# Patient Record
Sex: FEMALE | Race: WHITE | Employment: FULL TIME | URBAN - METROPOLITAN AREA
[De-identification: names, ages, dates, MRNs, and addresses within clinical notes are randomized per-mention and may not be internally consistent; named-entity substitution may affect disease eponyms.]

---

## 2017-02-22 ENCOUNTER — OFFICE VISIT (OUTPATIENT)
Dept: OBGYN CLINIC | Age: 42
End: 2017-02-22

## 2017-02-22 VITALS
DIASTOLIC BLOOD PRESSURE: 60 MMHG | WEIGHT: 148.4 LBS | SYSTOLIC BLOOD PRESSURE: 110 MMHG | HEIGHT: 62 IN | BODY MASS INDEX: 27.31 KG/M2

## 2017-02-22 DIAGNOSIS — Z01.419 ENCOUNTER FOR GYNECOLOGICAL EXAMINATION WITHOUT ABNORMAL FINDING: Primary | ICD-10-CM

## 2017-02-22 RX ORDER — AMOXICILLIN AND CLAVULANATE POTASSIUM 875; 125 MG/1; MG/1
TABLET, FILM COATED ORAL
COMMUNITY
Start: 2017-02-10 | End: 2017-03-17

## 2017-02-22 RX ORDER — FLUCONAZOLE 150 MG/1
150 TABLET ORAL DAILY
Qty: 1 TAB | Refills: 1 | Status: SHIPPED | OUTPATIENT
Start: 2017-02-22 | End: 2017-02-23

## 2017-02-22 NOTE — PROGRESS NOTES
Altagracia Vizcarra is a ,  46 y.o. female [facility] whose Patient's last menstrual period was 2017. who presents for her annual checkup. She is having yeast symptoms and is requesting a Diflucan. She has just finished an antibiotic and is c/o vaginal itching and discharge. With regard to the Gardisil vaccine, she is older than the FDA approved age to receive it. Menstrual status:    Her periods are heavy in flow, cannot leave house on first day. She is using three to five pads or tampons per day, usually regular and occur every 26-30 days. She denies dysmenorrhea. She reports no premenstrual symptoms. Contraception:    The current method of family planning is vasectomy. Sexual history:    She  reports that she currently engages in sexual activity and has had male partners. Medical conditions:    Since her last annual GYN exam about one year ago, she has not the following changes in her health history: none. Pap and Mammogram History:    Her most recent Pap smear was normal, HPV neg obtained 2016. The patient had a mammogram  which was negative for malignancy. The patient does have a family history of breast cancer. Past Medical History:   Diagnosis Date    Bulging disc     Family history of skin cancer     Fetal macrosomia in pregnancy     Scarring     Sun-damaged skin      Past Surgical History:   Procedure Laterality Date    HX  SECTION      2       Current Outpatient Prescriptions   Medication Sig Dispense Refill    amoxicillin-clavulanate (AUGMENTIN) 875-125 mg per tablet       fluticasone (FLONASE) 50 mcg/actuation nasal spray USE 1 SPRAY IN EACH NOSTRIL TWICE A DAY AS NEEDED. 1    venlafaxine-SR (EFFEXOR-XR) 37.5 mg capsule Take 1 Cap by mouth daily.  30 Cap 3    terconazole (TERAZOL 3) 80 mg vaginal suppository One per vagina at bedtime for 3 nights 3 Suppository 0     Allergies: Nuvaring [etonogestrel-ethinyl estradiol] and Adhesive   Social History     Social History    Marital status:      Spouse name: N/A    Number of children: N/A    Years of education: N/A     Occupational History    Not on file. Social History Main Topics    Smoking status: Never Smoker    Smokeless tobacco: Never Used    Alcohol use 0.6 - 1.2 oz/week     1 - 2 Glasses of wine per week    Drug use: No    Sexual activity: Yes     Partners: Male      Comment: partner w/ vasectomy     Other Topics Concern    Not on file     Social History Narrative     Tobacco History:  reports that she has never smoked. She has never used smokeless tobacco.  Alcohol Abuse:  reports that she drinks about 0.6 - 1.2 oz of alcohol per week   Drug Abuse:  reports that she does not use illicit drugs.     Patient Active Problem List   Diagnosis Code    Urinary frequency R35.0    Post partum depression F53    Bunion, left foot M21.612    Chronic back pain M54.9, G89.29       Review of Systems - History obtained from the patient  Constitutional: negative for weight loss, fever, night sweats  HEENT: negative for hearing loss, earache, congestion, snoring, sorethroat  CV: negative for chest pain, palpitations, edema  Resp: negative for cough, shortness of breath, wheezing  GI: negative for change in bowel habits, abdominal pain, black or bloody stools  : negative for frequency, dysuria, hematuria, vaginal discharge  MSK: negative for back pain, joint pain, muscle pain  Breast: negative for breast lumps, nipple discharge, galactorrhea  Skin :negative for itching, rash, hives  Neuro: negative for dizziness, headache, confusion, weakness  Psych: negative for anxiety, depression, change in mood  Heme/lymph: negative for bleeding, bruising, pallor    Physical Exam    Visit Vitals    /60    Ht 5' 2\" (1.575 m)    Wt 148 lb 6.4 oz (67.3 kg)    LMP 02/12/2017    BMI 27.14 kg/m2       Constitutional  · Appearance: well-nourished, well developed, alert, in no acute distress    HENT  · Head and Face: appears normal    Neck  · Inspection/Palpation: normal appearance, no masses or tenderness  · Lymph Nodes: no lymphadenopathy present  · Thyroid: gland size normal, nontender, no nodules or masses present on palpation    Chest  · Respiratory Effort: breathing normal  · Auscultation: normal breath sounds    Cardiovascular  · Heart:  · Auscultation: regular rate and rhythm without murmur    Breasts  · Inspection of Breasts: breasts symmetrical, no skin changes, no discharge present, nipple appearance normal, no skin retraction present  · Palpation of Breasts and Axillae: no masses present on palpation, no breast tenderness  · Axillary Lymph Nodes: no lymphadenopathy present    Gastrointestinal  · Abdominal Examination: abdomen non-tender to palpation, normal bowel sounds, no masses present  · Liver and spleen: no hepatomegaly present, spleen not palpable  · Hernias: no hernias identified    Genitourinary  · External Genitalia: normal appearance for age, no discharge present, no tenderness present, no inflammatory lesions present, no masses present, no atrophy present  · Vagina: normal vaginal vault without central or paravaginal defects, no discharge present, no inflammatory lesions present, no masses present  · Bladder: non-tender to palpation  · Urethra: appears normal  · Cervix: normal   · Uterus: normal size, shape and consistency  · Adnexa: no adnexal tenderness present, no adnexal masses present  · Perineum: perineum within normal limits, no evidence of trauma, no rashes or skin lesions present  · Anus: anus within normal limits, no hemorrhoids present  · Inguinal Lymph Nodes: no lymphadenopathy present    Skin  · General Inspection: no rash, no lesions identified    Neurologic/Psychiatric  · Mental Status:  · Orientation: grossly oriented to person, place and time  · Mood and Affect: mood normal, affect appropriate    .   Assessment:  Routine gynecologic examination  Her current medical status is satisfactory with no evidence of significant gynecologic issues.   menorrhagia  Plan:  Counseled re: diet, exercise, healthy lifestyle  Return for yearly wellness visits  Diflucan - in middle of monistat tx  Disc Novasure again - if desires needs end bx and sonohyst prior

## 2017-02-22 NOTE — PATIENT INSTRUCTIONS

## 2017-03-17 ENCOUNTER — TELEPHONE (OUTPATIENT)
Dept: OBGYN CLINIC | Age: 42
End: 2017-03-17

## 2017-03-17 ENCOUNTER — OFFICE VISIT (OUTPATIENT)
Dept: OBGYN CLINIC | Age: 42
End: 2017-03-17

## 2017-03-17 VITALS
BODY MASS INDEX: 27.23 KG/M2 | DIASTOLIC BLOOD PRESSURE: 60 MMHG | SYSTOLIC BLOOD PRESSURE: 116 MMHG | WEIGHT: 148 LBS | HEIGHT: 62 IN

## 2017-03-17 DIAGNOSIS — N76.0 VAGINITIS AND VULVOVAGINITIS: Primary | ICD-10-CM

## 2017-03-17 DIAGNOSIS — B37.31 YEAST VAGINITIS: ICD-10-CM

## 2017-03-17 DIAGNOSIS — R30.9 URINARY PAIN: ICD-10-CM

## 2017-03-17 DIAGNOSIS — N89.8 VAGINAL ITCHING: ICD-10-CM

## 2017-03-17 LAB
BILIRUB UR QL STRIP: NEGATIVE
GLUCOSE UR-MCNC: NEGATIVE MG/DL
KETONES P FAST UR STRIP-MCNC: NEGATIVE MG/DL
PH UR STRIP: NORMAL [PH] (ref 4.6–8)
PROT UR QL STRIP: NEGATIVE MG/DL
SP GR UR STRIP: NORMAL (ref 1–1.03)
UA UROBILINOGEN AMB POC: NORMAL (ref 0.2–1)
URINALYSIS CLARITY POC: CLEAR
URINALYSIS COLOR POC: NORMAL
URINE BLOOD POC: NEGATIVE
URINE LEUKOCYTES POC: NEGATIVE
URINE NITRITES POC: NEGATIVE
WET MOUNT POCT, WMPOCT: NORMAL

## 2017-03-17 RX ORDER — NYSTATIN AND TRIAMCINOLONE ACETONIDE 100000; 1 [USP'U]/G; MG/G
CREAM TOPICAL 2 TIMES DAILY
Qty: 30 G | Refills: 0 | Status: SHIPPED | OUTPATIENT
Start: 2017-03-17 | End: 2018-05-31

## 2017-03-17 RX ORDER — TERCONAZOLE 4 MG/G
1 CREAM VAGINAL
Qty: 45 G | Refills: 0 | Status: CANCELLED | OUTPATIENT
Start: 2017-03-17

## 2017-03-17 RX ORDER — TERCONAZOLE 8 MG/G
1 CREAM VAGINAL
Qty: 20 G | Refills: 0 | Status: SHIPPED | OUTPATIENT
Start: 2017-03-17 | End: 2017-04-05 | Stop reason: SDUPTHER

## 2017-03-17 NOTE — PROGRESS NOTES
Vaginitis evaluation    Chief Complaint   Vaginitis      HPI  46 y.o. female complains of minimal vaginal discharge. Patient's last menstrual period was 2017. She reports additional symptoms at this time:  Vaginal itching and burning. The patient denies aggravating factors. She is not concerned about possible STI exposure at this time. She denies exposure to new chemicals ot hygenic agents  Previous treatment included: none    Past Medical History:   Diagnosis Date    Bulging disc     Family history of skin cancer     Fetal macrosomia in pregnancy     Scarring     Sun-damaged skin      Past Surgical History:   Procedure Laterality Date    HX  SECTION      2     Social History     Occupational History    Not on file. Social History Main Topics    Smoking status: Never Smoker    Smokeless tobacco: Never Used    Alcohol use 0.6 - 1.2 oz/week     1 - 2 Glasses of wine per week    Drug use: No    Sexual activity: Yes     Partners: Male      Comment: partner w/ vasectomy     Family History   Problem Relation Age of Onset    Colon Cancer Mother     Cancer Mother 72     colon    Cancer Father      lung and liver    Alcohol abuse Father     Breast Cancer Maternal Aunt     Diabetes Maternal Aunt     Hypertension Maternal Aunt         Allergies   Allergen Reactions    Nuvaring [Etonogestrel-Ethinyl Estradiol] Hives    Adhesive Rash     Prior to Admission medications    Medication Sig Start Date End Date Taking? Authorizing Provider   nystatin-triamcinolone (MYCOLOG II) topical cream Apply  to affected area two (2) times a day. 3/17/17  Yes Gerardo Reynolds MD   terconazole (TERAZOL 3) 0.8 % vaginal cream Insert 1 Applicator into vagina nightly. 3/17/17  Yes Gerardo Reynolds MD   fluticasone (FLONASE) 50 mcg/actuation nasal spray USE 1 SPRAY IN EACH NOSTRIL TWICE A DAY AS NEEDED. 16  Yes Historical Provider   venlafaxine-SR (EFFEXOR-XR) 37.5 mg capsule Take 1 Cap by mouth daily. 11/3/16  Yes Hiwot Monet MD                      Review of Systems - History obtained from the patient  Constitutional: negative for weight loss, fever, night sweats  Breast: negative for breast lumps, nipple discharge, galactorrhea  GI: negative for change in bowel habits, abdominal pain, black or bloody stools  : negative for frequency, dysuria, hematuria  MSK: negative for back pain, joint pain, muscle pain  Skin: negative for itching, rash, hives  Neuro: negative for dizziness, headache, confusion, weakness  Psych: negative for anxiety, depression, change in mood  Heme/lymph: negative for bleeding, bruising, pallor       Objective:    Visit Vitals    /60    Ht 5' 2\" (1.575 m)    Wt 148 lb (67.1 kg)    LMP 03/12/2017    BMI 27.07 kg/m2       Physical Exam:   PHYSICAL EXAMINATION    Constitutional  · Appearance: well-nourished, well developed, alert, in no acute distress    HENT  · Head and Face: appears normal    Genitourinary  · External Genitalia: normal appearance for age, no discharge present, no tenderness present, no inflammatory lesions present, no masses present, no atrophy present  · Vagina:  white discharge present, otherwise normal vaginal vault without central or paravaginal defects, no inflammatory lesions present, no masses present  · Bladder: non-tender to palpation  · Urethra: appears normal  · Cervix: normal   · Uterus: normal size, shape and consistency  · Adnexa: no adnexal tenderness present, no adnexal masses present  · Perineum: perineum within normal limits, no evidence of trauma, no rashes or skin lesions present  · Anus: anus within normal limits, no hemorrhoids present  · Inguinal Lymph Nodes: no lymphadenopathy present    Skin  · General Inspection: no rash, no lesions identified    Neurologic/Psychiatric  · Mental Status:  · Orientation: grossly oriented to person, place and time  · Mood and Affect: mood normal, affect appropriate      .     Results for orders placed or performed in visit on 03/17/17   AMB POC SMEAR, STAIN & INTERPRET, WET MOUNT   Result Value Ref Range    Wet mount (POC)      Narrative    KOH    Hypae: pos  Buds: pos    Wet Prep:  Trich: negative  Clue cells: negative  Hyphae: negative  Buds: negative  WBC's: normal     AMB POC URINALYSIS DIP STICK MANUAL W/O MICRO   Result Value Ref Range    Color (UA POC) Dark Yellow     Clarity (UA POC) Clear     Glucose (UA POC) Negative Negative    Bilirubin (UA POC) Negative Negative    Ketones (UA POC) Negative Negative    Specific gravity (UA POC)  1.001 - 1.035    Blood (UA POC) Negative Negative    pH (UA POC)  4.6 - 8.0    Protein (UA POC) Negative Negative mg/dL    Urobilinogen (UA POC) normal 0.2 - 1    Nitrites (UA POC) Negative Negative    Leukocyte esterase (UA POC) Negative Negative       Assessment:   Yeast vag    Plan:   Terazol 3  mycolog  Nuswab    ROV prn if symptoms persist or worsen.

## 2017-03-17 NOTE — PATIENT INSTRUCTIONS
Pelvic Exam: Care Instructions  Your Care Instructions    When your doctor examines all of your pelvic organs, it's called a pelvic exam. Two good reasons to have this kind of exam are to check for sexually transmitted infections (STIs) and to get a Pap test. A Pap test is also called a Pap smear. It checks for early changes that can lead to cancer of the cervix. Sometimes a pelvic exam is part of a regular checkup. In this case, you can do some things to make your test results as accurate as possible. · Try to schedule the exam when you don't have your period. · Don't use douches, tampons, or vaginal medicines, sprays, or powders for 24 hours before your exam.  · Don't have sex for 24 hours before your exam.  Other times, women have this kind of exam at any time of the month. This is because they have pelvic pain, bleeding, or discharge. Or they may have another pelvic problem. Before your exam, it's important to share some information with your doctor. For example, if you are a survivor of rape or sexual abuse, you can talk about any concerns you may have. Your doctor will also want to know if you are pregnant or use birth control. And he or she will want to hear about any problems, surgeries, or procedures you have had in your pelvic area. You will also need to tell your doctor when your last period was. Follow-up care is a key part of your treatment and safety. Be sure to make and go to all appointments, and call your doctor if you are having problems. It's also a good idea to know your test results and keep a list of the medicines you take. How is a pelvic exam done? · During a pelvic exam, you will:  ¨ Take off your clothes below the waist. You will get a paper or cloth cover to put over the lower half of your body. Leonard Maidens on your back on an exam table. Your feet will be raised above you. Stirrups will support your feet. · The doctor will:  Wale Phan you to relax your knees.  Your knees need to lean out, toward the walls. ¨ Check the opening of your vagina for sores or swelling. ¨ Gently put a tool called a speculum into your vagina. It opens the vagina a little bit. You will feel some pressure. But if you are relaxed, it will not hurt. It lets your doctor see inside the vagina. ¨ Use a small brush, spatula, or swab to get a sample of cells, if you are having a Pap test or culture. The doctor then removes the speculum. ¨ Put on gloves and put one or two fingers of one hand into your vagina. The other hand goes on your lower belly. This lets your doctor feel your pelvic organs. You will probably feel some pressure. Try to stay relaxed. ¨ Put one gloved finger into your rectum and one into your vagina, if needed. This can also help check your pelvic organs. This exam takes about 10 minutes. At the end, you will get a washcloth or tissue to clean your vaginal area. It's normal to have some discharge after this exam. You can then get dressed. Some test results may be ready right away. But results from a culture or a Pap test may take several days or a few weeks. Why should you have a pelvic exam?  · You want to have recommended screening tests. This includes a Pap test.  · You think you have a vaginal infection. Signs include itching, burning, or unusual discharge. · You might have been exposed to a sexually transmitted infection (STI), such as chlamydia or herpes. · You have vaginal bleeding that is not part of your normal menstrual period. · You have pain in your belly or pelvis. · You have been sexually assaulted. A pelvic exam lets your doctor collect evidence and check for STIs. · You are pregnant. · You are having trouble getting pregnant. What are the risks of a pelvic exam?  There are no risks from a pelvic exam.  When should you call for help?   Watch closely for changes in your health, and be sure to contact your doctor if:  · You have heavy bleeding or discharge from your vagina after the exam.  Where can you learn more? Go to http://giovanna-ankit.info/. Enter W731 in the search box to learn more about \"Pelvic Exam: Care Instructions. \"  Current as of: February 25, 2016  Content Version: 11.1  © 0841-0307 [employer], [email or web address]. Care instructions adapted under license by [employer] (which disclaims liability or warranty for this information). If you have questions about a medical condition or this instruction, always ask your healthcare professional. [address] any warranty or liability for your use of this information.

## 2017-03-17 NOTE — TELEPHONE ENCOUNTER
Call received at 8:34 am    46year old patient last seen in the office on 2/22/17 for AE. Patient calling to say that she is having itching,redness, irritation and burning upon urination for about the last 5-6 days. Patient placed on the schedule for 9:00am this am . ( ok per April to db)  Patient verbalized understanding.

## 2017-03-21 LAB
A VAGINAE DNA VAG QL NAA+PROBE: ABNORMAL SCORE
BVAB2 DNA VAG QL NAA+PROBE: ABNORMAL SCORE
C ALBICANS DNA VAG QL NAA+PROBE: POSITIVE
C GLABRATA DNA VAG QL NAA+PROBE: NEGATIVE
MEGA1 DNA VAG QL NAA+PROBE: ABNORMAL SCORE
T VAGINALIS RRNA SPEC QL NAA+PROBE: NEGATIVE

## 2017-04-04 ENCOUNTER — TELEPHONE (OUTPATIENT)
Dept: OBGYN CLINIC | Age: 42
End: 2017-04-04

## 2017-04-04 NOTE — TELEPHONE ENCOUNTER
Patient calling stating that she is still having an issue with yeast and has been trying everything to clear it up with minimal relief. Patient reports that after she finished the terazol cream, she started a candida cleans diet, probiotics and tried OTC monistat to try and see if she could get rid of it. Patient reports itchiness, rawness and irritation on the inside, not so much on the outside. She has been using the mycolog cream on the outside that has helped. Patient is requesting help with treatment of this yeast infection. Patient reports that she read where a longer treatment diflucan may be needed and is willing to try anything. Please advise.

## 2017-04-05 RX ORDER — FLUCONAZOLE 150 MG/1
150 TABLET ORAL
Qty: 4 TAB | Refills: 5 | Status: SHIPPED | OUTPATIENT
Start: 2017-04-05 | End: 2017-10-31 | Stop reason: SDUPTHER

## 2017-04-05 RX ORDER — TERCONAZOLE 8 MG/G
1 CREAM VAGINAL
Qty: 20 G | Refills: 0 | Status: SHIPPED | OUTPATIENT
Start: 2017-04-05 | End: 2017-05-22 | Stop reason: SDUPTHER

## 2017-04-05 NOTE — TELEPHONE ENCOUNTER
Patient calling back and aware of MD recommendations. Patient aware that rx was sent to pharmacy of choice.

## 2017-05-22 ENCOUNTER — TELEPHONE (OUTPATIENT)
Dept: OBGYN CLINIC | Age: 42
End: 2017-05-22

## 2017-05-22 RX ORDER — TERCONAZOLE 8 MG/G
1 CREAM VAGINAL
Qty: 20 G | Refills: 0 | Status: SHIPPED | OUTPATIENT
Start: 2017-05-22 | End: 2017-06-07

## 2017-05-22 NOTE — TELEPHONE ENCOUNTER
Patient calling who is a long term treatment for yeast (weekly diflucan), missed her dose last week and was placed on Augmentin for a sinus infection 1.5 weeks ago and now reports vaginal burning, discharge & itching. Patient is due for her weekly diflucan today but is requesting a rx for terazol to take with the diflucan like she did when she started this course of diflucan. Please advise.

## 2017-05-25 NOTE — TELEPHONE ENCOUNTER
Call received in 1:14pm      46year old patient last seen in the office on 3/17/17. Patient calling back from Monday conversation. Patient reports she has completed the Terazol prescription and is still using the cream. Patient reports her vaginal itching and burning is better but she states her tongue is still pasty white . Patient states she has a prescription for Difucan that she had been taking once a week but missed to pills Patient is wondering if she can take a pill today and then again on Monday for her once a week.  Please advise

## 2017-06-07 ENCOUNTER — OFFICE VISIT (OUTPATIENT)
Dept: INTERNAL MEDICINE CLINIC | Age: 42
End: 2017-06-07

## 2017-06-07 VITALS
TEMPERATURE: 98.2 F | SYSTOLIC BLOOD PRESSURE: 98 MMHG | OXYGEN SATURATION: 98 % | HEART RATE: 83 BPM | WEIGHT: 142 LBS | HEIGHT: 62 IN | BODY MASS INDEX: 26.13 KG/M2 | DIASTOLIC BLOOD PRESSURE: 56 MMHG | RESPIRATION RATE: 14 BRPM

## 2017-06-07 DIAGNOSIS — J30.1 SEASONAL ALLERGIC RHINITIS DUE TO POLLEN: ICD-10-CM

## 2017-06-07 DIAGNOSIS — B37.0 ORAL YEAST INFECTION: Primary | ICD-10-CM

## 2017-06-07 RX ORDER — FLUCONAZOLE 200 MG/1
200 TABLET ORAL DAILY
Qty: 5 TAB | Refills: 0 | Status: SHIPPED | OUTPATIENT
Start: 2017-06-07 | End: 2017-06-12

## 2017-06-07 RX ORDER — CLOTRIMAZOLE 10 MG/1
10 LOZENGE ORAL; TOPICAL
COMMUNITY
End: 2018-05-31

## 2017-06-07 NOTE — PROGRESS NOTES
HISTORY OF PRESENT ILLNESS  Stefani Dalton Case is a 46 y.o. female. HPI  Patient presents today for a recurring yeast infection. In January she wasn't feeling well with a rash on both arms and tingling in her head. She had imaging for her head and was told she had Shingles by her specialist. She was prescribed Prednisone. In February she had a sinus infection and given Augmentin. Patient then developed yeast infection and given Diflucan and Terazol. 3 weeks ago she was put on Augmentin for sinus infection. She now complains of thrush on tongue. Patient is taking probiotic as followed by GYN. Patient was taking Phentermine prescribed by Dr. Kyra Blair. She has stopped this since being acutely ill. Review of Systems   All other systems reviewed and are negative. Physical Exam   Constitutional: She is oriented to person, place, and time. She appears well-developed and well-nourished. HENT:   Head: Normocephalic and atraumatic. Right Ear: External ear normal.   Left Ear: External ear normal.   Nose: Nose normal.   Mouth/Throat: Oropharynx is clear and moist.   Thrush underneath her tongue. Eyes: Conjunctivae and EOM are normal.   Neck: Normal range of motion. Neck supple. Carotid bruit is not present. No thyroid mass and no thyromegaly present. Cardiovascular: Normal rate, regular rhythm, S1 normal, S2 normal, normal heart sounds and intact distal pulses. Pulmonary/Chest: Effort normal and breath sounds normal.   Abdominal: Soft. Normal appearance and bowel sounds are normal. There is no hepatosplenomegaly. There is no tenderness. Musculoskeletal: Normal range of motion. Neurological: She is alert and oriented to person, place, and time. She has normal strength. No cranial nerve deficit or sensory deficit. Coordination normal.   Skin: Skin is warm, dry and intact. No abrasion and no rash noted. Psychiatric: She has a normal mood and affect.  Her behavior is normal. Judgment and thought content normal.   Nursing note and vitals reviewed. ASSESSMENT and PLAN  Hazel Calderon was seen today for yeast infection. Diagnoses and all orders for this visit:    Oral yeast infection  Prescribed Diflucan. Follow up with GYN for vaginal probiotic for frequent yeast infections. Do not restart Phentermine until completed Diflucan. Seasonal allergic rhinitis due to pollen  Continue to use Flonase and Zyrtec. Consider Xyzal if Zyrtec or Claritin ineffective. Other orders  -     fluconazole (DIFLUCAN) 200 mg tablet; Take 1 Tab by mouth daily for 5 days. FDA advises cautious prescribing of oral fluconazole in pregnancy. lab results and schedule of future lab studies reviewed with patient  reviewed diet, exercise and weight control    Written by Ct Gonzales, as dictated by Jane Vaughn MD.     Current diagnosis and concerns discussed with pt at length. Understands risks and benefits or current treatment plan and medications and accepts the treatment and medication with any possible risks. Pt asks appropriate questions which were answered. Pt instructed to call with any concerns or problems.

## 2017-06-30 ENCOUNTER — OFFICE VISIT (OUTPATIENT)
Dept: OBGYN CLINIC | Age: 42
End: 2017-06-30

## 2017-06-30 ENCOUNTER — APPOINTMENT (OUTPATIENT)
Dept: MAMMOGRAPHY | Age: 42
End: 2017-06-30

## 2017-06-30 DIAGNOSIS — Z12.31 ENCOUNTER FOR SCREENING MAMMOGRAM FOR MALIGNANT NEOPLASM OF BREAST: ICD-10-CM

## 2017-06-30 DIAGNOSIS — Z12.31 ENCOUNTER FOR SCREENING MAMMOGRAM FOR MALIGNANT NEOPLASM OF BREAST: Primary | ICD-10-CM

## 2017-08-24 ENCOUNTER — TELEPHONE (OUTPATIENT)
Dept: OBGYN CLINIC | Age: 42
End: 2017-08-24

## 2017-08-24 RX ORDER — TERCONAZOLE 4 MG/G
1 CREAM VAGINAL
Qty: 45 G | Refills: 0 | Status: SHIPPED | OUTPATIENT
Start: 2017-08-24 | End: 2018-05-23 | Stop reason: SDUPTHER

## 2017-08-24 NOTE — TELEPHONE ENCOUNTER
Patient calling stating that 1.5 weeks ago she was on vacation and forgotten to take her diflucan dose as she is on weekly diflucan and noticed that when she got back she is having a full blown yeast infection with vaginal/vulvar itching and vaginal discharge making it uncomfortable to sit/walk. Patient took 1 pill of diflucan and OTC monistat 3 days ago with no relief. Patient was in a wet bathing suit while on vacation and believes this triggered the yeast.    Please advise.

## 2017-09-29 ENCOUNTER — TELEPHONE (OUTPATIENT)
Dept: OBGYN CLINIC | Age: 42
End: 2017-09-29

## 2017-09-29 RX ORDER — FLUCONAZOLE 150 MG/1
150 TABLET ORAL DAILY
Qty: 1 TAB | Refills: 0 | Status: SHIPPED | OUTPATIENT
Start: 2017-09-29 | End: 2017-09-30

## 2017-09-29 NOTE — TELEPHONE ENCOUNTER
Patient calling stating that she recently went to the pool with her children and now has another yeast infection and she does not have anymore refills on the weekly diflucan that she was prescribed. Patient states that based on her insurance, it can be cheaper if she uses the 200mg tablets and get a whole months worth at a time. Patient states that she got the 3 day monistat as she states she \"was desperate for relief\". Please advise.

## 2017-09-29 NOTE — TELEPHONE ENCOUNTER
John Kenny MD   You 2 hours ago (11:36 AM)                 One diflucan 150 mg.  Use hydrocortisone externally tid (Routing comment)

## 2017-10-31 ENCOUNTER — OFFICE VISIT (OUTPATIENT)
Dept: INTERNAL MEDICINE CLINIC | Age: 42
End: 2017-10-31

## 2017-10-31 VITALS
TEMPERATURE: 98.1 F | HEIGHT: 62 IN | WEIGHT: 144 LBS | DIASTOLIC BLOOD PRESSURE: 56 MMHG | BODY MASS INDEX: 26.5 KG/M2 | OXYGEN SATURATION: 99 % | RESPIRATION RATE: 14 BRPM | HEART RATE: 65 BPM | SYSTOLIC BLOOD PRESSURE: 115 MMHG

## 2017-10-31 DIAGNOSIS — B37.9 YEAST INFECTION: Primary | ICD-10-CM

## 2017-10-31 RX ORDER — FLUCONAZOLE 200 MG/1
200 TABLET ORAL
Qty: 4 TAB | Refills: 5 | Status: SHIPPED | OUTPATIENT
Start: 2017-10-31 | End: 2018-05-23 | Stop reason: SDUPTHER

## 2017-10-31 RX ORDER — FLUCONAZOLE 200 MG/1
200 TABLET ORAL
Qty: 4 TAB | Refills: 5 | Status: SHIPPED | OUTPATIENT
Start: 2017-10-31 | End: 2017-10-31 | Stop reason: SDUPTHER

## 2017-10-31 NOTE — PROGRESS NOTES
HISTORY OF PRESENT ILLNESS  Angela Vizcarra is a 38 y.o. female. HPI   Patient reports a continued yeast infection. She admits to burning, irritation, and redness vaginally. She notes the Diflucan 200 mg  helped to clear the infection, but since she stopped Diflucan it has come back. She notes the infection is also in her mouth. Patient reports she has been watching her diet and is unsure of why the infection is recurring. Patient reports she has followed up with dermatology and GYN with no resolve for the infection. She inquires about following up with infectious disease or an allergist. Patient reports she has been watching her diet because some foods cause swelling and irritation in her neck. Review of Systems   All other systems reviewed and are negative. Physical Exam   Constitutional: She is oriented to person, place, and time. She appears well-developed and well-nourished. Neck: Normal range of motion. Neck supple. Carotid bruit is not present. No thyroid mass and no thyromegaly present. Cardiovascular: Normal rate, regular rhythm, S1 normal, S2 normal, normal heart sounds and intact distal pulses. Pulmonary/Chest: Effort normal and breath sounds normal.   Abdominal: Normal appearance. There is no hepatosplenomegaly. There is no tenderness. Musculoskeletal: Normal range of motion. Neurological: She is alert and oriented to person, place, and time. She has normal strength. No cranial nerve deficit or sensory deficit. Coordination normal.   Skin: Skin is warm, dry and intact. No abrasion and no rash noted. rash   Psychiatric: She has a normal mood and affect. Her behavior is normal. Judgment and thought content normal.   Nursing note and vitals reviewed. ASSESSMENT and PLAN  Diagnoses and all orders for this visit:    1. Yeast infection   Patient is on chronic yeast treatment of Diflucan. Encouraged patient to follow up with allergist, nutritionist, or infectious disease if possible.  Not entirely sure of the cause of recurrent yeast infections. We are going to increase the diflucan to 200 mg a week and first try ID if that does not work we can try an allergist or a naturopath if needed     Other orders  -     fluconazole (DIFLUCAN) 200 mg tablet; Take 1 Tab by mouth every seven (7) days. lab results and schedule of future lab studies reviewed with patient  reviewed diet, exercise and weight control    Written by Deanna Mcdonald, as dictated by Jordi Diamond MD.     Current diagnosis and concerns discussed with pt at length. Understands risks and benefits or current treatment plan and medications and accepts the treatment and medication with any possible risks. Pt asks appropriate questions which were answered. Pt instructed to call with any concerns or problems.

## 2017-11-06 ENCOUNTER — OFFICE VISIT (OUTPATIENT)
Dept: OBGYN CLINIC | Age: 42
End: 2017-11-06

## 2017-11-06 VITALS
DIASTOLIC BLOOD PRESSURE: 60 MMHG | HEIGHT: 62 IN | SYSTOLIC BLOOD PRESSURE: 100 MMHG | WEIGHT: 142 LBS | BODY MASS INDEX: 26.13 KG/M2

## 2017-11-06 DIAGNOSIS — N89.8 VAGINAL DISCHARGE: ICD-10-CM

## 2017-11-06 DIAGNOSIS — B37.9 INFECTION DUE TO YEAST: ICD-10-CM

## 2017-11-06 DIAGNOSIS — N89.8 VAGINAL ITCHING: Primary | ICD-10-CM

## 2017-11-06 RX ORDER — NYSTATIN 100000 [USP'U]/ML
1 SUSPENSION ORAL 4 TIMES DAILY
Qty: 60 ML | Refills: 2 | Status: SHIPPED | OUTPATIENT
Start: 2017-11-06 | End: 2018-05-31

## 2017-11-06 NOTE — PROGRESS NOTES
Vaginitis evaluation    Chief Complaint   Vaginitis      HPI  38 y.o. female complains of white vaginal discharge, vaginal itching and burning on/off recurring since April. Patient's last menstrual period was 10/22/2017 (exact date). She was taking 6 months of monthly Diflucan. Says it didn't work and she took numerous monistat and terazol. Most recently seen at PCP due to thrush and was given Diflucan 200mg x 4 - she has taken 2 of them with relief of her mouth. She also used Refresh in the vagina several days ago on advice from pharmacist  She denies additional symptoms at this time. The patient denies aggravating factors. She is not concerned about possible STI exposure at this time. She denies exposure to new chemicals ot hygenic agents  Previous treatment included: 200 mg Diflucan and vaginal inserts    Past Medical History:   Diagnosis Date    Bulging disc     Family history of skin cancer     Fetal macrosomia in pregnancy     Scarring     Sun-damaged skin      Past Surgical History:   Procedure Laterality Date    HX  SECTION      2     Social History     Occupational History    Not on file. Social History Main Topics    Smoking status: Never Smoker    Smokeless tobacco: Never Used    Alcohol use 0.6 - 1.2 oz/week     1 - 2 Glasses of wine per week    Drug use: No    Sexual activity: Yes     Partners: Male      Comment: partner w/ vasectomy     Family History   Problem Relation Age of Onset    Colon Cancer Mother     Cancer Mother 72     colon    Cancer Father      lung and liver    Alcohol abuse Father     Breast Cancer Maternal Aunt     Diabetes Maternal Aunt     Hypertension Maternal Aunt         Allergies   Allergen Reactions    Nuvaring [Etonogestrel-Ethinyl Estradiol] Hives    Adhesive Rash     Prior to Admission medications    Medication Sig Start Date End Date Taking?  Authorizing Provider   nystatin (MYCOSTATIN) 100,000 unit/mL suspension Take 5 mL by mouth four (4) times daily. swish and swallow 11/6/17  Yes Rudy Hunter MD   fluconazole (DIFLUCAN) 200 mg tablet Take 1 Tab by mouth every seven (7) days. 10/31/17  Yes Renato Soto MD   terconazole (TERAZOL 7) 0.4 % vaginal cream Insert 1 Applicator into vagina nightly. 8/24/17  Yes Rudy Hunter MD   B.infantis-B.ani-B.long-B.bifi (PROBIOTIC 4X) 10-15 mg TbEC Take  by mouth. Yes Historical Provider   nystatin-triamcinolone (MYCOLOG II) topical cream Apply  to affected area two (2) times a day. 3/17/17  Yes Rudy Hunter MD   fluticasone (FLONASE) 50 mcg/actuation nasal spray USE 1 SPRAY IN EACH NOSTRIL TWICE A DAY AS NEEDED. 8/19/16  Yes Historical Provider   clotrimazole (MYCELEX) 10 mg gema Take 10 mg by mouth five (5) times daily.     Historical Provider                      Review of Systems - History obtained from the patient  Constitutional: negative for weight loss, fever, night sweats  Breast: negative for breast lumps, nipple discharge, galactorrhea  GI: negative for change in bowel habits, abdominal pain, black or bloody stools  : negative for frequency, dysuria, hematuria  MSK: negative for back pain, joint pain, muscle pain  Skin: negative for itching, rash, hives  Neuro: negative for dizziness, headache, confusion, weakness  Psych: negative for anxiety, depression, change in mood  Heme/lymph: negative for bleeding, bruising, pallor       Objective:    Visit Vitals    /60    Ht 5' 2\" (1.575 m)    Wt 142 lb (64.4 kg)    LMP 10/22/2017 (Exact Date)    BMI 25.97 kg/m2       Physical Exam:   PHYSICAL EXAMINATION    Constitutional  · Appearance: well-nourished, well developed, alert, in no acute distress    HENT  · Head and Face: appears normal    Genitourinary  · External Genitalia: normal appearance for age, no  discharge present, no tenderness present, no inflammatory lesions present, no masses present, no atrophy present  · Vagina: \"refresh\" discharge present, otherwise normal vaginal vault without central or paravaginal defects, no inflammatory lesions present, no masses present  · Bladder: non-tender to palpation  · Urethra: appears normal  · Cervix: normal   · Uterus: normal size, shape and consistency  · Adnexa: no adnexal tenderness present, no adnexal masses present  · Perineum: perineum within normal limits, no evidence of trauma, no rashes or skin lesions present  · Anus: anus within normal limits, no hemorrhoids present  · Inguinal Lymph Nodes: no lymphadenopathy present    Skin  · General Inspection: no rash, no lesions identified    Neurologic/Psychiatric  · Mental Status:  · Orientation: grossly oriented to person, place and time  · Mood and Affect: mood normal, affect appropriate      Assessment:   Recurrent yeast vag s/p weekly Diflucan  Unclear whether she was self-treating actual vaginal yeast or not  Thrush - resolving    Plan:   Nothing in vagina unless rx by MD  If thinks she has vaginal yeast needs to be seen in office before any treatment  She plans to see allergist  Stop refresh  nuswab sent  If positive, will do 2 weeks of Boric Acid supp. Would also consider gentian violet is needed. Probiotics - 50 jessi from [facility] - given written instructions  nystain swish and swallow x 1 week    ROV prn if symptoms persist or worsen.

## 2017-11-09 LAB
A VAGINAE DNA VAG QL NAA+PROBE: NORMAL SCORE
BVAB2 DNA VAG QL NAA+PROBE: NORMAL SCORE
C ALBICANS DNA VAG QL NAA+PROBE: NEGATIVE
C GLABRATA DNA VAG QL NAA+PROBE: NEGATIVE
MEGA1 DNA VAG QL NAA+PROBE: NORMAL SCORE
T VAGINALIS RRNA SPEC QL NAA+PROBE: NEGATIVE

## 2017-11-13 NOTE — PROGRESS NOTES
[facility] message not read yet. Called and notified patient of nuswab results. Pt states she took her last weekly Diflucan on Saturday because she was having symptoms.   Pt states she will see an allergist.

## 2017-11-28 ENCOUNTER — OFFICE VISIT (OUTPATIENT)
Dept: OBGYN CLINIC | Age: 42
End: 2017-11-28

## 2017-11-28 VITALS
SYSTOLIC BLOOD PRESSURE: 115 MMHG | HEIGHT: 62 IN | WEIGHT: 143 LBS | BODY MASS INDEX: 26.31 KG/M2 | DIASTOLIC BLOOD PRESSURE: 80 MMHG | HEART RATE: 64 BPM

## 2017-11-28 DIAGNOSIS — R30.0 DYSURIA: ICD-10-CM

## 2017-11-28 DIAGNOSIS — R31.9 HEMATURIA, UNSPECIFIED TYPE: Primary | ICD-10-CM

## 2017-11-28 DIAGNOSIS — N90.89 VULVAR IRRITATION: ICD-10-CM

## 2017-11-28 LAB
BILIRUB UR QL STRIP: NEGATIVE
GLUCOSE UR-MCNC: NEGATIVE MG/DL
KETONES P FAST UR STRIP-MCNC: NORMAL MG/DL
PH BODY FLUID, POCT, PHBFPOCT: 4.5
PH UR STRIP: 4.6 [PH] (ref 4.6–8)
PROT UR QL STRIP: NORMAL
SOURCE POCT, SRCEPOCT: NORMAL
SP GR UR STRIP: 1.01 (ref 1–1.03)
UA UROBILINOGEN AMB POC: NORMAL (ref 0.2–1)
URINALYSIS CLARITY POC: NORMAL
URINALYSIS COLOR POC: YELLOW
URINE BLOOD POC: NORMAL
URINE LEUKOCYTES POC: NEGATIVE
URINE NITRITES POC: NEGATIVE
WET MOUNT POCT, WMPOCT: NEGATIVE

## 2017-11-28 NOTE — PATIENT INSTRUCTIONS
Bacterial Vaginosis: Care Instructions  Your Care Instructions    Bacterial vaginosis is a type of vaginal infection. It is caused by excess growth of certain bacteria that are normally found in the vagina. Symptoms can include itching, swelling, pain when you urinate or have sex, and a gray or yellow discharge with a \"fishy\" odor. It is not considered an infection that is spread through sexual contact. Although symptoms can be annoying and uncomfortable, bacterial vaginosis does not usually cause other health problems. However, if you have it while you are pregnant, it can cause complications. While the infection may go away on its own, most doctors use antibiotics to treat it. You may have been prescribed pills or vaginal cream. With treatment, bacterial vaginosis usually clears up in 5 to 7 days. Follow-up care is a key part of your treatment and safety. Be sure to make and go to all appointments, and call your doctor if you are having problems. It's also a good idea to know your test results and keep a list of the medicines you take. How can you care for yourself at home? · Take your antibiotics as directed. Do not stop taking them just because you feel better. You need to take the full course of antibiotics. · Do not eat or drink anything that contains alcohol if you are taking metronidazole (Flagyl). · Keep using your medicine if you start your period. Use pads instead of tampons while using a vaginal cream or suppository. Tampons can absorb the medicine. · Wear loose cotton clothing. Do not wear nylon and other materials that hold body heat and moisture close to the skin. · Do not scratch. Relieve itching with a cold pack or a cool bath. · Do not wash your vaginal area more than once a day. Use plain water or a mild, unscented soap. Do not douche. When should you call for help?   Watch closely for changes in your health, and be sure to contact your doctor if:  ? · You have unexpected vaginal bleeding. ? · You have a fever. ? · You have new or increased pain in your vagina or pelvis. ? · You are not getting better after 1 week. ? · Your symptoms return after you finish the course of your medicine. Where can you learn more? Go to http://giovanna-ankit.info/. Regis Anchors in the search box to learn more about \"Bacterial Vaginosis: Care Instructions. \"  Current as of: October 13, 2016  Content Version: 11.4  © 9792-3507 [employer]. Care instructions adapted under license by [employer] (which disclaims liability or warranty for this information). If you have questions about a medical condition or this instruction, always ask your healthcare professional. [address] any warranty or liability for your use of this information. Urinary Tract Infection in Women: Care Instructions  Your Care Instructions    A urinary tract infection, or UTI, is a general term for an infection anywhere between the kidneys and the urethra (where urine comes out). Most UTIs are bladder infections. They often cause pain or burning when you urinate. UTIs are caused by bacteria and can be cured with antibiotics. Be sure to complete your treatment so that the infection goes away. Follow-up care is a key part of your treatment and safety. Be sure to make and go to all appointments, and call your doctor if you are having problems. It's also a good idea to know your test results and keep a list of the medicines you take. How can you care for yourself at home? · Take your antibiotics as directed. Do not stop taking them just because you feel better. You need to take the full course of antibiotics. · Drink extra water and other fluids for the next day or two. This may help wash out the bacteria that are causing the infection.  (If you have kidney, heart, or liver disease and have to limit fluids, talk with your doctor before you increase your fluid intake.)  · Avoid drinks that are carbonated or have caffeine. They can irritate the bladder. · Urinate often. Try to empty your bladder each time. · To relieve pain, take a hot bath or lay a heating pad set on low over your lower belly or genital area. Never go to sleep with a heating pad in place. To prevent UTIs  · Drink plenty of water each day. This helps you urinate often, which clears bacteria from your system. (If you have kidney, heart, or liver disease and have to limit fluids, talk with your doctor before you increase your fluid intake.)  · Urinate when you need to. · Urinate right after you have sex. · Change sanitary pads often. · Avoid douches, bubble baths, feminine hygiene sprays, and other feminine hygiene products that have deodorants. · After going to the bathroom, wipe from front to back. When should you call for help? Call your doctor now or seek immediate medical care if:  ? · Symptoms such as fever, chills, nausea, or vomiting get worse or appear for the first time. ? · You have new pain in your back just below your rib cage. This is called flank pain. ? · There is new blood or pus in your urine. ? · You have any problems with your antibiotic medicine. ? Watch closely for changes in your health, and be sure to contact your doctor if:  ? · You are not getting better after taking an antibiotic for 2 days. ? · Your symptoms go away but then come back. Where can you learn more? Go to http://giovanna-ankit.info/. Enter T182 in the search box to learn more about \"Urinary Tract Infection in Women: Care Instructions. \"  Current as of: May 12, 2017  Content Version: 11.4  © 1310-6466 [employer]. Care instructions adapted under license by [employer] (which disclaims liability or warranty for this information).  If you have questions about a medical condition or this instruction, always ask your healthcare professional. [employer] disclaims any warranty or liability for your use of this information.

## 2017-11-28 NOTE — PROGRESS NOTES
Problem Visit-Complete    Chief Complaint   UTI and Vaginitis      HPI  Altagracia Posadas Case is a 38 y.o. female who presents for the evaluation of possible infection. Patient's last menstrual period was 2017 (exact date). The patient complains of vaginal rawness, pain with urination and frequency. The symptoms are moderate. They started a few days ago. Since then they have become unchanged. Associated symptoms: burning with urination - primarily when urine hits the skin. Aggravating factors: none. Alleviating factors: none. Chronic/recurrent yeast since April. Has been tx'd with Diflucan, including weekly suppression; Terazol. Has not been tx'd with boric acid suppositories. Sx usually recur after her period. Has been taking probiotic, started after her last visit . NuSwab Vaginitis was neg for BV/yeast/trich on , but had taken Diflucan 2d prior. Sx much worse over last week or so, after IC. Periods heavy - uses Always brand pads. Considering ablation. The patient denies fever. Results for orders placed or performed in visit on 17   AMB POC URINALYSIS DIP STICK AUTO W/O MICRO   Result Value Ref Range    Color (UA POC) Yellow     Clarity (UA POC) Slightly Cloudy     Glucose (UA POC) Negative Negative    Bilirubin (UA POC) Negative Negative    Ketones (UA POC) Trace Negative    Specific gravity (UA POC) 1.010 1.001 - 1.035    Blood (UA POC) Trace Negative    pH (UA POC) 4.6 4.6 - 8.0    Protein (UA POC) 2+ Negative    Urobilinogen (UA POC) normal 0.2 - 1    Nitrites (UA POC) Negative Negative    Leukocyte esterase (UA POC) Negative Negative         Past Medical History:   Diagnosis Date    Bulging disc     Family history of skin cancer     Fetal macrosomia in pregnancy     Scarring     Sun-damaged skin      Past Surgical History:   Procedure Laterality Date    HX  SECTION      2     Social History     Occupational History    Not on file.      Social History Main Topics    Smoking status: Never Smoker    Smokeless tobacco: Never Used    Alcohol use 0.6 - 1.2 oz/week     1 - 2 Glasses of wine per week    Drug use: No    Sexual activity: Yes     Partners: Male      Comment: partner w/ vasectomy     Family History   Problem Relation Age of Onset    Colon Cancer Mother     Cancer Mother 72     colon    Cancer Father      lung and liver    Alcohol abuse Father     Breast Cancer Maternal Aunt     Diabetes Maternal Aunt     Hypertension Maternal Aunt        Allergies   Allergen Reactions    Nuvaring [Etonogestrel-Ethinyl Estradiol] Hives    Adhesive Rash     Prior to Admission medications    Medication Sig Start Date End Date Taking? Authorizing Provider   nystatin (MYCOSTATIN) 100,000 unit/mL suspension Take 5 mL by mouth four (4) times daily. swish and swallow 11/6/17   Wilfredo Babcock MD   fluconazole (DIFLUCAN) 200 mg tablet Take 1 Tab by mouth every seven (7) days. 10/31/17   Rufino Desir MD   terconazole (TERAZOL 7) 0.4 % vaginal cream Insert 1 Applicator into vagina nightly. 8/24/17   Wilfredo Babcock MD   clotrimazole [facility]) 10 mg gema Take 10 mg by mouth five (5) times daily. Historical Provider   B.infantis-B.ani-B.long-B.bifi (PROBIOTIC 4X) 10-15 mg TbEC Take  by mouth. Historical Provider   nystatin-triamcinolone (MYCOLOG II) topical cream Apply  to affected area two (2) times a day.  3/17/17   Wilfredo Babcock MD   fluticasone (FLONASE) 50 mcg/actuation nasal spray USE 1 SPRAY IN EACH NOSTRIL TWICE A DAY AS NEEDED. 8/19/16   Historical Provider            Objective:  Visit Vitals    /80    Pulse 64    Ht 5' 2\" (1.575 m)    Wt 143 lb (64.9 kg)    LMP 11/20/2017 (Exact Date)    BMI 26.16 kg/m2       Physical Exam:     Constitutional  · Appearance: well-nourished, well developed, alert, in no acute distress    HENT  · Head and Face: appears normal      Gastrointestinal  · Abdominal Examination: abdomen non-tender to palpation, masses present  · Liver and spleen: no hepatomegaly present, spleen not palpable  · Hernias: no hernias identified    Genitourinary  · External Genitalia: minimal erythema, o/w nl, no focal lesions or discharge  · Vagina: normal vaginal vault without central or paravaginal defects, very scant brown menstrual blood, no other discharge present, no odor, no inflammatory lesions present, no masses present  · Bladder: non-tender to palpation  · Urethra: appears normal  · Cervix: normal   · Uterus: upper nl size, minimally tender  · Adnexa: no adnexal tenderness present, no adnexal masses present  · Perineum: perineum within normal limits, no evidence of trauma, no rashes or skin lesions present  · Anus: anus within normal limits, no hemorrhoids present  · Inguinal Lymph Nodes: no lymphadenopathy present    Skin  · General Inspection: no rash, no lesions identified    Results for orders placed or performed in visit on 11/28/17   AMB POC URINALYSIS DIP STICK AUTO W/O MICRO   Result Value Ref Range    Color (UA POC) Yellow     Clarity (UA POC) Slightly Cloudy     Glucose (UA POC) Negative Negative    Bilirubin (UA POC) Negative Negative    Ketones (UA POC) Trace Negative    Specific gravity (UA POC) 1.010 1.001 - 1.035    Blood (UA POC) Trace Negative    pH (UA POC) 4.6 4.6 - 8.0    Protein (UA POC) 2+ Negative    Urobilinogen (UA POC) normal 0.2 - 1    Nitrites (UA POC) Negative Negative    Leukocyte esterase (UA POC) Negative Negative   AMB POC SMEAR, STAIN & INTERPRET, WET MOUNT   Result Value Ref Range    Wet mount (POC) negative    AMB POC PH, BODY FLUID EXCEPT BLOOD   Result Value Ref Range    pH,Body fluid (POC) 4.5     Source           Neurologic/Psychiatric  · Mental Status:  · Orientation: grossly oriented to person, place and time  · Mood and Affect: mood normal, affect appropriate    Assessment:  Vulvar irritation  H/o chronic/recurrent yeast  Urinary frequency, dysuria  WP/KOH neg    Plan:   - nuswab for BV/yeast  - UA/C&S  - avoid Always brand pads  - OTC hydrocortisone ointment - apply externally to vulva 2-4x/d prn    Orders Placed This Encounter    CULTURE, URINE    URINALYSIS W/ RFLX MICROSCOPIC    NUSWAB VAGINOSIS + CANDIDA    AMB POC URINALYSIS DIP STICK AUTO W/O MICRO    AMB POC SMEAR, STAIN & INTERPRET, WET MOUNT    AMB POC PH, BODY FLUID EXCEPT BLOOD

## 2017-11-29 LAB
APPEARANCE UR: ABNORMAL
BILIRUB UR QL STRIP: NEGATIVE
COLOR UR: YELLOW
GLUCOSE UR QL: NEGATIVE
HGB UR QL STRIP: NEGATIVE
KETONES UR QL STRIP: NEGATIVE
LEUKOCYTE ESTERASE UR QL STRIP: NEGATIVE
MICRO URNS: ABNORMAL
NITRITE UR QL STRIP: NEGATIVE
PH UR STRIP: 7 [PH] (ref 5–7.5)
PROT UR QL STRIP: NEGATIVE
SP GR UR: 1.02 (ref 1–1.03)
UROBILINOGEN UR STRIP-MCNC: 0.2 MG/DL (ref 0.2–1)

## 2017-11-30 LAB
A VAGINAE DNA VAG QL NAA+PROBE: NORMAL SCORE
BACTERIA UR CULT: NORMAL
BVAB2 DNA VAG QL NAA+PROBE: NORMAL SCORE
C ALBICANS DNA VAG QL NAA+PROBE: NEGATIVE
C GLABRATA DNA VAG QL NAA+PROBE: NEGATIVE
MEGA1 DNA VAG QL NAA+PROBE: NORMAL SCORE

## 2018-01-22 RX ORDER — FLUTICASONE PROPIONATE 50 MCG
SPRAY, SUSPENSION (ML) NASAL
Qty: 48 G | Refills: 4 | Status: SHIPPED | OUTPATIENT
Start: 2018-01-22 | End: 2020-12-22 | Stop reason: SDUPTHER

## 2018-04-26 ENCOUNTER — TELEPHONE (OUTPATIENT)
Dept: OBGYN CLINIC | Age: 43
End: 2018-04-26

## 2018-04-26 RX ORDER — FLUCONAZOLE 100 MG/1
100 TABLET ORAL DAILY
Qty: 7 TAB | Refills: 0 | Status: SHIPPED | OUTPATIENT
Start: 2018-04-26 | End: 2018-05-03

## 2018-04-26 NOTE — TELEPHONE ENCOUNTER
Patient is calling, typically sees TH. Her last AE was 2/20/17 and she is aware that she is due for appointment. She is calling because she has a delay in getting a copy of her insurance card to make the next available appointment with [address] [place] [address] for AE but she has a medical problem. She is having white itchy discharge without odor, slight swelling vaginally. She had been taking probiotics to help with chronic yeast issues and she ran out and the symptoms returned. She would like to see if someone would be willing to call in a Diflucan for her and she will call back when she gets her new insurance card to schedule for AE in May with TH. Okay to send in [facility]? CRICKET Snigh.

## 2018-05-22 ENCOUNTER — TELEPHONE (OUTPATIENT)
Dept: OBGYN CLINIC | Age: 43
End: 2018-05-22

## 2018-05-22 NOTE — TELEPHONE ENCOUNTER
Patient has problems with chronic yeast and Diflucan is not helping. Usually sees TH and she is out of office. Patient placed on work in physicians schedule for appt.

## 2018-05-23 ENCOUNTER — OFFICE VISIT (OUTPATIENT)
Dept: OBGYN CLINIC | Age: 43
End: 2018-05-23

## 2018-05-23 DIAGNOSIS — N76.0 ACUTE VAGINITIS: Primary | ICD-10-CM

## 2018-05-23 RX ORDER — TERCONAZOLE 4 MG/G
1 CREAM VAGINAL
Qty: 45 G | Refills: 0 | Status: SHIPPED | OUTPATIENT
Start: 2018-05-23 | End: 2018-06-15

## 2018-05-23 RX ORDER — FLUCONAZOLE 200 MG/1
200 TABLET ORAL DAILY
Qty: 4 TAB | Refills: 5 | Status: SHIPPED | OUTPATIENT
Start: 2018-05-23 | End: 2018-05-28

## 2018-05-23 NOTE — MR AVS SNAPSHOT
[address] [facility] [address] [facility] 
[phone] Patient: Johnny Vizcarra MRN: [id number] [facility]:[birth year] Visit Information Date & Time Provider Department Dept. Phone Encounter #  
 5/23/2018  1:10 PM MD Hipolito Rubi [phone] [id number] Your Appointments 5/23/2018  1:10 PM  
ESTABLISHED PATIENT with MD [facility] Appt Note: TH patient- chronic yeast- not responding to Diflucan; TH patient- chronic yeast- not responding to [id number] [facility] 99 [id number]  
[facility] [address] Upcoming Health Maintenance Date Due  
 BREAST CANCER SCRN MAMMOGRAM 6/30/2018 Influenza Age 5 to Adult 8/1/2018 PAP AKA CERVICAL CYTOLOGY 1/14/2021 Allergies as of 5/23/2018  Review Complete On: 5/23/2018 By: Nani Woo Severity Noted Reaction Type Reactions Eh Rosaster [Etonogestrel-ethinyl Estradiol]  09/01/2015    Hives Adhesive Low 09/01/2015    Rash Current Immunizations  Never Reviewed No immunizations on file. Not reviewed this visit You Were Diagnosed With   
  
 Codes Comments Acute vaginitis    -  Primary ICD-10-CM: N76.0 ICD-9-CM: 616.10 Vitals OB Status Smoking Status Having regular periods Never Smoker Preferred Pharmacy Pharmacy Name Phone [id number] [address] [phone] Your Updated Medication List  
  
   
This list is accurate as of 5/23/18 12:58 PM.  Always use your most recent med list.  
  
  
  
  
 clotrimazole 10 mg gema Commonly known as:  Aleda Francisca Take 10 mg by mouth five (5) times daily. fluconazole 200 mg tablet Commonly known as:  DIFLUCAN Take 1 Tab by mouth every seven (7) days. fluticasone 50 mcg/actuation nasal spray Commonly known as:  FLONASE  
USE ONE SPRAY IN EACH NOSTRIL TWICE A DAY AS NEEDED  
  
 nystatin 100,000 unit/mL suspension Commonly known as:  MYCOSTATIN Take 5 mL by mouth four (4) times daily. swish and swallow  
  
 nystatin-triamcinolone topical cream  
Commonly known as:  MYCOLOG II Apply  to affected area two (2) times a day. PROBIOTIC 4X 10-15 mg Tbec Generic drug:  B.infantis-B.ani-B.long-B.bifi Take  by mouth. terconazole 0.4 % vaginal cream  
Commonly known as:  TERAZOL 7 Insert 1 Applicator into vagina nightly. We Performed the Following NUSWAB VAGINOSIS + CANDIDA [[id number] Custom] Introducing [facility] & [facility]! Dear Gibran Vincent: Thank you for requesting a [employer] account. Our records indicate that you already have an active [email or web address] account. You can access your account anytime at [email or web address] Did you know that you can access your hospital and ER discharge instructions at any time in [employer]? You can also review all of your test results from your hospital stay or ER visit. Additional Information If you have questions, please visit the Frequently Asked Questions section of the [email or web address] website at [email or web address] Remember, [email or web address] is NOT to be used for urgent needs. For medical emergencies, dial 911. Now available from your iPhone and Android! Please provide this summary of care documentation to your next provider. Your primary care clinician is listed as Mitali Pina. If you have any questions after today's visit, please call [phone].

## 2018-05-23 NOTE — PROGRESS NOTES
Chief Complaint   Vaginitis      HPI  38 y.o. female complains of white vaginal discharge for several days. .No LMP recorded. She admits to additional symptoms at this time. Itching/ irritation  The patient  admits to aggravating factors- recent antibiotic use  She denies exposure to new chemicals ot hygenic agents  Previous treatment included:  Diflucan with no improvement     Past Medical History:   Diagnosis Date    Bulging disc     Family history of skin cancer     Fetal macrosomia in pregnancy     Scarring     Sun-damaged skin      Past Surgical History:   Procedure Laterality Date    HX  SECTION      2     Social History     Occupational History    Not on file. Social History Main Topics    Smoking status: Never Smoker    Smokeless tobacco: Never Used    Alcohol use 0.6 - 1.2 oz/week     1 - 2 Glasses of wine per week    Drug use: No    Sexual activity: Yes     Partners: Male      Comment: partner w/ vasectomy     Family History   Problem Relation Age of Onset    Colon Cancer Mother     Cancer Mother 72     colon    Cancer Father      lung and liver    Alcohol abuse Father     Breast Cancer Maternal Aunt     Diabetes Maternal Aunt     Hypertension Maternal Aunt         Allergies   Allergen Reactions    Nuvaring [Etonogestrel-Ethinyl Estradiol] Hives    Adhesive Rash     Prior to Admission medications    Medication Sig Start Date End Date Taking? Authorizing Provider   fluticasone (FLONASE) 50 mcg/actuation nasal spray USE ONE SPRAY IN EACH NOSTRIL TWICE A DAY AS NEEDED 18   Juan J Blum MD   nystatin (MYCOSTATIN) 100,000 unit/mL suspension Take 5 mL by mouth four (4) times daily. swish and swallow 17   Santos Woods MD   fluconazole (DIFLUCAN) 200 mg tablet Take 1 Tab by mouth every seven (7) days. 10/31/17   Juan J Blum MD   terconazole (TERAZOL 7) 0.4 % vaginal cream Insert 1 Applicator into vagina nightly.  17   Santos Woods MD clotrimazole (MYCELEX) 10 mg gema Take 10 mg by mouth five (5) times daily. Historical Provider   B.infantis-B.ani-B.long-B.bifi (PROBIOTIC 4X) 10-15 mg TbEC Take  by mouth. Historical Provider   nystatin-triamcinolone (MYCOLOG II) topical cream Apply  to affected area two (2) times a day. 3/17/17   Shayan Drake MD                      Review of Systems - History obtained from the patient  Constitutional: negative for weight loss, fever, night sweats  Breast: negative for breast lumps, nipple discharge, galactorrhea  GI: negative for change in bowel habits, abdominal pain, black or bloody stools  : negative for frequency, dysuria, hematuria  MSK: negative for back pain, joint pain, muscle pain  Skin: negative for itching, rash, hives  Neuro: negative for dizziness, headache, confusion, weakness  Psych: negative for anxiety, depression, change in mood  Heme/lymph: negative for bleeding, bruising, pallor       Objective: There were no vitals taken for this visit.     Physical Exam:   PHYSICAL EXAMINATION    Constitutional  · Appearance: well-nourished, well developed, alert, in no acute distress    HENT  · Head and Face: appears normal    Genitourinary  · External Genitalia: normal appearance for age, + discharge present, no tenderness present, no inflammatory lesions present, no masses present, no atrophy present  · Vagina: + discharge present, otherwise normal vaginal vault without central or paravaginal defects, no inflammatory lesions present, no masses present  · Bladder: non-tender to palpation  · Urethra: appears normal  · Cervix: normal   · Uterus: normal size, shape and consistency  · Adnexa: no adnexal tenderness present, no adnexal masses present  · Perineum: perineum within normal limits, no evidence of trauma, no rashes or skin lesions present  · Anus: anus within normal limits, no hemorrhoids present  · Inguinal Lymph Nodes: no lymphadenopathy present    Skin  · General Inspection: no rash, no lesions identified    Neurologic/Psychiatric  · Mental Status:  · Orientation: grossly oriented to person, place and time  · Mood and Affect: mood normal, affect appropriate    Assessment:   Vaginitis- likely yeast, will f/u with nuswab and treat with terazol. Plan:   ROV prn if symptoms persist or worsen.

## 2018-05-27 LAB
A VAGINAE DNA VAG QL NAA+PROBE: NORMAL SCORE
BVAB2 DNA VAG QL NAA+PROBE: NORMAL SCORE
C ALBICANS DNA VAG QL NAA+PROBE: NEGATIVE
C GLABRATA DNA VAG QL NAA+PROBE: NEGATIVE
MEGA1 DNA VAG QL NAA+PROBE: NORMAL SCORE

## 2018-05-29 ENCOUNTER — TELEPHONE (OUTPATIENT)
Dept: OBGYN CLINIC | Age: 43
End: 2018-05-29

## 2018-05-29 NOTE — TELEPHONE ENCOUNTER
This was done while I was on vacation - not given by me. She needs an appt later this week.  I am not here this pm and she needs to see me personally

## 2018-05-29 NOTE — TELEPHONE ENCOUNTER
Patient advised of MD recommendations and was placed on the schedule to be seen on 5/31/18 at 9:30am(ok per [facility])    Patient verbalized understanding.

## 2018-05-29 NOTE — TELEPHONE ENCOUNTER
Message left at 9:31am      38year old patient last seen in the office on 5/23/18 for problem visit. Patient left a message stating that she is still having issues with vaginal discharge ( yeast smell) and has thrush in mouth. Patient reports she is almost finished with the Terazol prescribed on 5/23/18. Patient was sent a prescription for Diflucan that same day but states the pharmacy refill a previous script for Diflucan. Patient states she took 2  200 mg tabs for 2 days and that is not helping. Patient reports she has a history of yeast infections. Patient reports being on an antibiotic for sinus infection that started up her symptoms. Patient reports restarting to take a probiotic. Patient is wondering how to proceed.     ? Ov    Please advise

## 2018-05-31 ENCOUNTER — OFFICE VISIT (OUTPATIENT)
Dept: OBGYN CLINIC | Age: 43
End: 2018-05-31

## 2018-05-31 VITALS
BODY MASS INDEX: 26.13 KG/M2 | HEIGHT: 62 IN | SYSTOLIC BLOOD PRESSURE: 116 MMHG | DIASTOLIC BLOOD PRESSURE: 78 MMHG | WEIGHT: 142 LBS

## 2018-05-31 DIAGNOSIS — N89.8 VAGINAL DISCHARGE: Primary | ICD-10-CM

## 2018-05-31 LAB — WET MOUNT POCT, WMPOCT: NORMAL

## 2018-05-31 NOTE — PROGRESS NOTES
Vaginitis evaluation    Chief Complaint   Follow-up and Vaginitis      HPI  38 y.o. female complains of recurrent yeast infection for several weeks. No LMP recorded. Additional symptoms include thrush. Seen by PCP - referred by ID but did not want to go. Just saw her tongue was \"white and could not brush it off\"  The patient denies aggravating factors. She is not concerned about possible STI exposure at this time. She denies exposure to new chemicals ot hygenic agents  Previous treatment included: Diflucan for 7 days by Dr. Nabeel Gallo, nystatin, terazol. She states the diflucan provided no relief. She saw Dr. Lenka Pierce on 18 and started the terazol which is helping and she needs a refill of the nystatin. She is also taking a  probiotic with 50 billion cultures 2x daily. She last used Terazol 2 days ago. Incidentally, her nuswab done by Dr. Lenka Pierce was negative for yeast.    She has no itching or burning. Just saw a clumpy discharge. Pt advised that recurrent thrush in mouth is unusual in a healthy person and I agree with PCP that if sx return she should see ID. Also advised that yeast in vagina is not related to her mouth    Past Medical History:   Diagnosis Date    Bulging disc     Family history of skin cancer     Fetal macrosomia in pregnancy     Scarring     Sun-damaged skin      Past Surgical History:   Procedure Laterality Date    HX  SECTION      2     Social History     Occupational History    Not on file.      Social History Main Topics    Smoking status: Never Smoker    Smokeless tobacco: Never Used    Alcohol use 0.6 - 1.2 oz/week     1 - 2 Glasses of wine per week    Drug use: No    Sexual activity: Yes     Partners: Male      Comment: partner w/ vasectomy     Family History   Problem Relation Age of Onset    Colon Cancer Mother     Cancer Mother 72     colon    Cancer Father      lung and liver    Alcohol abuse Father     Breast Cancer Maternal Aunt     Diabetes Maternal Aunt     Hypertension Maternal Aunt         Allergies   Allergen Reactions    Nuvaring [Etonogestrel-Ethinyl Estradiol] Hives    Adhesive Rash     Prior to Admission medications    Medication Sig Start Date End Date Taking? Authorizing Provider   terconazole (TERAZOL 7) 0.4 % vaginal cream Insert 1 Applicator into vagina nightly.  5/23/18  Yes Susan Kline MD   fluticasone (FLONASE) 50 mcg/actuation nasal spray USE ONE SPRAY IN EACH NOSTRIL TWICE A DAY AS NEEDED 1/22/18  Yes Allegra Liao MD                      Review of Systems - History obtained from the patient  Constitutional: negative for weight loss, fever, night sweats  Breast: negative for breast lumps, nipple discharge, galactorrhea  GI: negative for change in bowel habits, abdominal pain, black or bloody stools  : negative for frequency, dysuria, hematuria  MSK: negative for back pain, joint pain, muscle pain  Skin: negative for itching, rash, hives  Neuro: negative for dizziness, headache, confusion, weakness  Psych: negative for anxiety, depression, change in mood  Heme/lymph: negative for bleeding, bruising, pallor       Objective:    Visit Vitals    /78    Ht 5' 2\" (1.575 m)    Wt 142 lb (64.4 kg)    BMI 25.97 kg/m2       Physical Exam:   PHYSICAL EXAMINATION    Constitutional  · Appearance: well-nourished, well developed, alert, in no acute distress    HENT  · Head and Face: appears normal    Genitourinary  · External Genitalia: normal appearance for age, no discharge present, no tenderness present, no inflammatory lesions present, no masses present, no atrophy present  · Vagina:  min discharge present, otherwise normal vaginal vault without central or paravaginal defects, no inflammatory lesions present, no masses present  · Bladder: non-tender to palpation  · Urethra: appears normal  · Cervix: normal   · Uterus: normal size, shape and consistency  · Adnexa: no adnexal tenderness present, no adnexal masses present  · Perineum: perineum within normal limits, no evidence of trauma, no rashes or skin lesions present  · Anus: anus within normal limits, no hemorrhoids present  · Inguinal Lymph Nodes: no lymphadenopathy present    Skin  · General Inspection: no rash, no lesions identified    Neurologic/Psychiatric  · Mental Status:  · Orientation: grossly oriented to person, place and time  · Mood and Affect: mood normal, affect appropriate      . Results for orders placed or performed in visit on 05/31/18   AMB POC SMEAR, STAIN & INTERPRET, WET MOUNT   Result Value Ref Range    Wet mount (POC)      Narrative    PALMA    Hypae: negative  Buds: negative    Wet Prep:  Trich: negative  Clue cells: negative  Hyphae: negative  Buds: negative  WBC's: normal         Assessment:   Vaginal discharge    Plan:   Wet prep normal, recent terazol use  nuswab was negative  Feeling better, reevaluate at AE    ROV prn if symptoms persist or worsen.

## 2018-06-15 ENCOUNTER — OFFICE VISIT (OUTPATIENT)
Dept: OBGYN CLINIC | Age: 43
End: 2018-06-15

## 2018-06-15 VITALS
HEIGHT: 62 IN | WEIGHT: 148 LBS | BODY MASS INDEX: 27.23 KG/M2 | DIASTOLIC BLOOD PRESSURE: 70 MMHG | SYSTOLIC BLOOD PRESSURE: 112 MMHG

## 2018-06-15 DIAGNOSIS — Z01.419 ENCOUNTER FOR GYNECOLOGICAL EXAMINATION (GENERAL) (ROUTINE) WITHOUT ABNORMAL FINDINGS: Primary | ICD-10-CM

## 2018-06-15 NOTE — PATIENT INSTRUCTIONS
Breast Self-Exam: Care Instructions  Your Care Instructions    A breast self-exam is when you check your breasts for lumps or changes. This regular exam helps you learn how your breasts normally look and feel. Most breast problems or changes are not because of cancer. Breast self-exam is not a substitute for a mammogram. Having regular breast exams by your doctor and regular mammograms improve your chances of finding any problems with your breasts. Some women set a time each month to do a step-by-step breast self-exam. Other women like a less formal system. They might look at their breasts as they brush their teeth, or feel their breasts once in a while in the shower. If you notice a change in your breast, tell your doctor. Follow-up care is a key part of your treatment and safety. Be sure to make and go to all appointments, and call your doctor if you are having problems. It's also a good idea to know your test results and keep a list of the medicines you take. How do you do a breast self-exam?  · The best time to examine your breasts is usually one week after your menstrual period begins. Your breasts should not be tender then. If you do not have periods, you might do your exam on a day of the month that is easy to remember. · To examine your breasts:  ¨ Remove all your clothes above the waist and lie down. When you are lying down, your breast tissue spreads evenly over your chest wall, which makes it easier to feel all your breast tissue. ¨ Use the pads-not the fingertips-of the 3 middle fingers of your left hand to check your right breast. Move your fingers slowly in small coin-sized circles that overlap. ¨ Use three levels of pressure to feel of all your breast tissue. Use light pressure to feel the tissue close to the skin surface. Use medium pressure to feel a little deeper. Use firm pressure to feel your tissue close to your breastbone and ribs.  Use each pressure level to feel your breast tissue before moving on to the next spot. ¨ Check your entire breast, moving up and down as if following a strip from the collarbone to the bra line, and from the armpit to the ribs. Repeat until you have covered the entire breast.  ¨ Repeat this procedure for your left breast, using the pads of the 3 middle fingers of your right hand. · To examine your breasts while in the shower:  ¨ Place one arm over your head and lightly soap your breast on that side. ¨ Using the pads of your fingers, gently move your hand over your breast (in the strip pattern described above), feeling carefully for any lumps or changes. ¨ Repeat for the other breast.  · Have your doctor inspect anything you notice to see if you need further testing. Where can you learn more? Go to http://giovanna-ankit.info/. Enter P148 in the search box to learn more about \"Breast Self-Exam: Care Instructions. \"  Current as of: May 12, 2017  Content Version: 11.4  © 2330-3204 [employer]. Care instructions adapted under license by [employer] (which disclaims liability or warranty for this information). If you have questions about a medical condition or this instruction, always ask your healthcare professional. [place] [address] any warranty or liability for your use of this information.

## 2018-06-15 NOTE — PROGRESS NOTES
Bethany Vizcarra is a ,  38 y.o. female [facility] whose LMP was on 2018 who presents for her annual checkup. She is having no significant problems. Feels much better    Menstrual status:    Her periods are moderate in flow. She is using three to five pads or tampons per day, usually regular every 26-30 days. She denies dysmenorrhea. She reports no premenstrual symptoms. The patient is not using HRT. Contraception:    The current method of family planning is vasectomy. Sexual history:    She  reports that she currently engages in sexual activity and has had male partners. Medical conditions:    Since her last annual GYN exam about 17 ago, she has had the following changes in her health history: none. Pap and Mammogram History:    Her most recent Pap smear was normal/-HPV obtained 2016 year(s) ago. The patient has her mammogram scheduled for 18 here at our office. .    Breast Cancer History/Substance Abuse:    She has no family history of breast cancer. Osteoporosis History:    Family history does not include a first or second degree relative with osteopenia or osteoporosis. A bone density scan was not obtained. She is currently not taking calcium and vit D. Past Medical History:   Diagnosis Date    Bulging disc     Family history of skin cancer     Fetal macrosomia in pregnancy     Scarring     Sun-damaged skin      Past Surgical History:   Procedure Laterality Date    HX  SECTION      2     Current Outpatient Prescriptions   Medication Sig Dispense Refill    fluticasone (FLONASE) 50 mcg/actuation nasal spray USE ONE SPRAY IN EACH NOSTRIL TWICE A DAY AS NEEDED 48 g 4    terconazole (TERAZOL 7) 0.4 % vaginal cream Insert 1 Applicator into vagina nightly.  45 g 0     Allergies: Nuvaring [etonogestrel-ethinyl estradiol] and Adhesive   Social History     Social History    Marital status:      Spouse name: N/A    Number of children: N/A    Years of education: N/A     Occupational History    Not on file. Social History Main Topics    Smoking status: Never Smoker    Smokeless tobacco: Never Used    Alcohol use 0.6 - 1.2 oz/week     1 - 2 Glasses of wine per week    Drug use: No    Sexual activity: Yes     Partners: Male      Comment: partner w/ vasectomy     Other Topics Concern    Not on file     Social History Narrative     Tobacco History:  reports that she has never smoked. She has never used smokeless tobacco.  Alcohol Abuse:  reports that she drinks about 0.6 - 1.2 oz of alcohol per week   Drug Abuse:  reports that she does not use illicit drugs.   Patient Active Problem List   Diagnosis Code    Urinary frequency R35.0    Post partum depression F53    Bunion, left foot M21.612    Chronic back pain M54.9, G89.29         Review of Systems - History obtained from the patient  Constitutional: negative for weight loss, fever, night sweats  HEENT: negative for hearing loss, earache, congestion, snoring, sorethroat  CV: negative for chest pain, palpitations, edema  Resp: negative for cough, shortness of breath, wheezing  GI: negative for change in bowel habits, abdominal pain, black or bloody stools  : negative for frequency, dysuria, hematuria, vaginal discharge  MSK: negative for back pain, joint pain, muscle pain  Breast: negative for breast lumps, nipple discharge, galactorrhea  Skin :negative for itching, rash, hives  Neuro: negative for dizziness, headache, confusion, weakness  Psych: negative for anxiety, depression, change in mood  Heme/lymph: negative for bleeding, bruising, pallor    Physical Exam    Visit Vitals    /70    Ht 5' 2\" (1.575 m)    Wt 148 lb (67.1 kg)    LMP 06/08/2018 (Approximate)    BMI 27.07 kg/m2     Constitutional  · Appearance: well-nourished, well developed, alert, in no acute distress    HENT  · Head and Face: appears normal    Neck  · Inspection/Palpation: normal appearance, no masses or tenderness  · Lymph Nodes: no lymphadenopathy present  · Thyroid: gland size normal, nontender, no nodules or masses present on palpation    Chest  · Respiratory Effort: breathing normal  · Auscultation: normal breath sounds    Cardiovascular  · Heart:  · Auscultation: regular rate and rhythm without murmur    Breasts  · Inspection of Breasts: breasts symmetrical, no skin changes, no discharge present, nipple appearance normal, no skin retraction present  · Palpation of Breasts and Axillae: no masses present on palpation, no breast tenderness  · Axillary Lymph Nodes: no lymphadenopathy present    Gastrointestinal  · Abdominal Examination: abdomen non-tender to palpation, normal bowel sounds, no masses present  · Liver and spleen: no hepatomegaly present, spleen not palpable  · Hernias: no hernias identified    Skin  · General Inspection: no rash, no lesions identified    Neurologic/Psychiatric  · Mental Status:  · Orientation: grossly oriented to person, place and time  · Mood and Affect: mood normal, affect appropriate    Genitourinary  · External Genitalia: normal appearance for age, no discharge present, no tenderness present, no inflammatory lesions present, no masses present, no atrophy present  · Vagina: normal vaginal vault without central or paravaginal defects, no discharge present, no inflammatory lesions present, no masses present  · Bladder: non-tender to palpation  · Urethra: appears normal  · Cervix: normal   · Uterus: normal size, shape and consistency  · Adnexa: no adnexal tenderness present, no adnexal masses present  · Perineum: perineum within normal limits, no evidence of trauma, no rashes or skin lesions present  · Anus: anus within normal limits, no hemorrhoids present  · Inguinal Lymph Nodes: no lymphadenopathy present    Assessment:  Routine gynecologic examination  Her current medical status is satisfactory with no evidence of significant gynecologic issues.     Plan:  Counseled re: diet, exercise, healthy lifestyle  Return for yearly wellness visits  Rec annual mammogram

## 2018-07-19 ENCOUNTER — APPOINTMENT (OUTPATIENT)
Dept: OBGYN CLINIC | Age: 43
End: 2018-07-19

## 2018-10-18 ENCOUNTER — OFFICE VISIT (OUTPATIENT)
Dept: INTERNAL MEDICINE CLINIC | Age: 43
End: 2018-10-18

## 2018-10-18 VITALS
TEMPERATURE: 98.1 F | RESPIRATION RATE: 14 BRPM | DIASTOLIC BLOOD PRESSURE: 71 MMHG | HEIGHT: 62 IN | HEART RATE: 68 BPM | BODY MASS INDEX: 27.82 KG/M2 | SYSTOLIC BLOOD PRESSURE: 112 MMHG | OXYGEN SATURATION: 99 % | WEIGHT: 151.2 LBS

## 2018-10-18 DIAGNOSIS — R10.84 GENERALIZED ABDOMINAL PAIN: ICD-10-CM

## 2018-10-18 DIAGNOSIS — I87.9 VEIN DISORDER: ICD-10-CM

## 2018-10-18 DIAGNOSIS — M54.2 NECK PAIN: Primary | ICD-10-CM

## 2018-10-18 RX ORDER — VENLAFAXINE 37.5 MG/1
37.5 TABLET ORAL DAILY
Qty: 30 TAB | Refills: 3 | Status: SHIPPED | OUTPATIENT
Start: 2018-10-18 | End: 2019-07-10

## 2018-10-18 NOTE — PROGRESS NOTES
HISTORY OF PRESENT ILLNESS Ammy Bell Case is a 41 y.o. female. Medication Evaluation Neck Pain: Pt slept in uncomfortable position on 10/09/18 which caused localized neck pain. Denies pain radiating down arm. She visits chiropractor consistently (1x/week). She followed up with chiropractor 3x this week who used neck traction device (effective). Mood: Pt followed up with counselor on 10/01/18. She feels overwhelmed and anxious from taking care of children. She requests meds. Abdominal Pain: Pt has hernia and associated abdominal pain and protrusion. Denies fever, chills, bowel changes, or nausea. She wishes to f/u for laparoscopic surgery. Vein Disorder: Stable. Pt will f/u with Dr. Benjie Soto for treatment (vein closure). Review of Systems All other systems reviewed and are negative. Physical Exam  
Constitutional: She is oriented to person, place, and time. She appears well-developed and well-nourished. HENT:  
Head: Normocephalic and atraumatic. Right Ear: External ear normal.  
Left Ear: External ear normal.  
Nose: Nose normal.  
Mouth/Throat: Oropharynx is clear and moist.  
Eyes: Conjunctivae and EOM are normal.  
Neck: Normal range of motion. Neck supple. Carotid bruit is not present. No thyroid mass and no thyromegaly present. Cardiovascular: Normal rate, regular rhythm, S1 normal, S2 normal, normal heart sounds and intact distal pulses. Pulmonary/Chest: Effort normal and breath sounds normal.  
Abdominal: Soft. Normal appearance and bowel sounds are normal. There is no hepatosplenomegaly. There is no tenderness. Musculoskeletal: Normal range of motion. Neurological: She is alert and oriented to person, place, and time. She has normal strength. No cranial nerve deficit or sensory deficit. Coordination normal.  
Skin: Skin is warm, dry and intact. No abrasion and no rash noted. Psychiatric: She has a normal mood and affect.  Her behavior is normal. Judgment and thought content normal.  
Nursing note and vitals reviewed. ASSESSMENT and PLAN Diagnoses and all orders for this visit: 
 
Neck pain Stable, and well-managed with chiropractor visits. Generalized abdominal pain Stable condition. Pt will f/u with Dr. Coleen Ormond for possible surgery (hernia repair). Referral given. Will monitor for updates. -     REFERRAL TO GENERAL SURGERY Vein disorder Stable condition. Pt will f/u with Dr. Asia Honeycutt for treatment (vein closure). -     venlafaxine (EFFEXOR) 37.5 mg tablet; Take 1 Tab by mouth daily. Additional Comments: Prescribed Effexor (1 tablet/day) for increased anxiety. Discussed that, if current dosage isn't effective, pt can take 2 tablets/day. Will monitor for any changes or improvements. Over 50% of the 25 minutes face to face with Patrick HICKEY Case consisted of counseling and/or discussing treatment plans in reference to her anxiety , her abdominal hernia . This note will not be viewable in [address] Lab results and schedule of future lab studies reviewed with patient. Reviewed diet, exercise and weight control. Written by Katharina Brown, as dictated by Scarlet Lambert MD.  
 
Current diagnosis and concerns discussed with pt at length. Understands risks and benefits or current treatment plan and medications and accepts the treatment and medication with any possible risks. Pt asks appropriate questions which were answered. Pt instructed to call with any concerns or problems.

## 2018-11-01 ENCOUNTER — TELEPHONE (OUTPATIENT)
Dept: INTERNAL MEDICINE CLINIC | Age: 43
End: 2018-11-01

## 2018-11-01 NOTE — TELEPHONE ENCOUNTER
----- Message from Maria Dolores Xavier sent at 11/1/2018 11:27 AM EDT -----  Regarding: Dr. Kerry Davis  Pt requesting a call back concerning misplaced referral paperwork and needs the name of the dental surgeon Dr. Boubacar Grijalva referred her to. Best contact number is [phone].

## 2019-01-02 ENCOUNTER — OFFICE VISIT (OUTPATIENT)
Dept: OBGYN CLINIC | Age: 44
End: 2019-01-02

## 2019-01-02 ENCOUNTER — TELEPHONE (OUTPATIENT)
Dept: OBGYN CLINIC | Age: 44
End: 2019-01-02

## 2019-01-02 VITALS
BODY MASS INDEX: 25.47 KG/M2 | HEIGHT: 62 IN | WEIGHT: 138.4 LBS | SYSTOLIC BLOOD PRESSURE: 120 MMHG | DIASTOLIC BLOOD PRESSURE: 68 MMHG

## 2019-01-02 DIAGNOSIS — N89.8 VAGINAL ODOR: ICD-10-CM

## 2019-01-02 DIAGNOSIS — N92.0 MENORRHAGIA WITH REGULAR CYCLE: Primary | ICD-10-CM

## 2019-01-02 NOTE — PROGRESS NOTES
Chief Complaint   Tampon Removal      HPI  Hannah Cardoso Case is a 41 y.o. female who presents for the evaluation of a retained tampon. Patient's last menstrual period was 2018. She states her period is always very heavy so she had to double up on tampons and pads. She c/o minor odor and thinks a tampon is stuck inside her vagina. Associated symptoms: none. Aggravating symptoms: none. Alleviating factors: none. Past Medical History:   Diagnosis Date    Bulging disc     Family history of skin cancer     Fetal macrosomia in pregnancy     Scarring     Sun-damaged skin      Past Surgical History:   Procedure Laterality Date    HX  SECTION      2     Social History     Occupational History    Not on file   Tobacco Use    Smoking status: Never Smoker    Smokeless tobacco: Never Used   Substance and Sexual Activity    Alcohol use: Yes     Alcohol/week: 0.6 - 1.2 oz     Types: 1 - 2 Glasses of wine per week    Drug use: No    Sexual activity: Yes     Partners: Male     Comment: partner w/ vasectomy     Family History   Problem Relation Age of Onset    Colon Cancer Mother     Cancer Mother 72        colon    Cancer Father         lung and liver    Alcohol abuse Father     Breast Cancer Maternal Aunt     Diabetes Maternal Aunt     Hypertension Maternal Aunt        Allergies   Allergen Reactions    Nuvaring [Etonogestrel-Ethinyl Estradiol] Hives    Adhesive Rash     Prior to Admission medications    Medication Sig Start Date End Date Taking? Authorizing Provider   venlafaxine (EFFEXOR) 37.5 mg tablet Take 1 Tab by mouth daily.  10/18/18  Yes Yessy Tsai MD   fluticasone (FLONASE) 50 mcg/actuation nasal spray USE ONE SPRAY IN EACH NOSTRIL TWICE A DAY AS NEEDED 18  Yes Yessy Tsai MD        Review of Systems: History obtained from the patient  Constitutional: negative for weight loss, fever, night sweats  Breast: negative for breast lumps, nipple discharge, galactorrhea  GI: negative for change in bowel habits, abdominal pain, black or bloody stools  : negative for frequency, dysuria, hematuria, vaginal discharge  MSK: negative for back pain, joint pain, muscle pain  Skin: negative for itching, rash, hives  Psych: negative for anxiety, depression, change in mood      Objective:  Visit Vitals  /68   Ht 5' 2\" (1.575 m)   Wt 138 lb 6.4 oz (62.8 kg)   LMP 12/27/2018   BMI 25.31 kg/m²       Physical Exam:   PHYSICAL EXAMINATION    Constitutional  · Appearance: well-nourished, well developed, alert, in no acute distress    Gastrointestinal  · Abdominal Examination: abdomen non-tender to palpation, normal bowel sounds, no masses present  · Liver and spleen: no hepatomegaly present, spleen not palpable  · Hernias: no hernias identified    Genitourinary  · External Genitalia: normal appearance for age, no discharge present, no tenderness present, no inflammatory lesions present, no masses present, no atrophy present  · Vagina: normal vaginal vault without central or paravaginal defects, no discharge present, no inflammatory lesions present, no masses present  · Bladder: non-tender to palpation  · Urethra: appears normal  · Cervix: normal   · Uterus: normal size, shape and consistency  · Adnexa: no adnexal tenderness present, no adnexal masses present  · Perineum: perineum within normal limits, no evidence of trauma, no rashes or skin lesions present  · Anus: anus within normal limits, no hemorrhoids present  · Inguinal Lymph Nodes: no lymphadenopathy present    Skin  · General Inspection: no rash, no lesions identified    Neurologic/Psychiatric  · Mental Status:  · Orientation: grossly oriented to person, place and time  · Mood and Affect: mood normal, affect appropriate    Assessment:   No retained tampon  Menorrhagia  Vaginal discharge  Plan:   nuswab  mirena with menses      RTO prn if symptoms persist or worsen. Instructions given to pt.   Handouts given to pt.

## 2019-01-02 NOTE — TELEPHONE ENCOUNTER
Patient calling stating that over the past weekend, she had to double up on her tampons due to the intensity of the flow and believes there is a retained tampon that she cannot remove herself. Spoke with Dr. Hayden Brown' nurse and advised to come in at 3:00pm today to have it removed. Patient encouraged to speak with Dr. Hayden Brown about possible infection. Patient verbalized understanding.

## 2019-05-13 ENCOUNTER — OFFICE VISIT (OUTPATIENT)
Dept: OBGYN CLINIC | Age: 44
End: 2019-05-13

## 2019-05-13 VITALS
SYSTOLIC BLOOD PRESSURE: 118 MMHG | WEIGHT: 138 LBS | DIASTOLIC BLOOD PRESSURE: 60 MMHG | HEIGHT: 62 IN | BODY MASS INDEX: 25.4 KG/M2

## 2019-05-13 DIAGNOSIS — Z32.02 NEGATIVE PREGNANCY TEST: ICD-10-CM

## 2019-05-13 DIAGNOSIS — Z30.430 ENCOUNTER FOR INSERTION OF MIRENA IUD: Primary | ICD-10-CM

## 2019-05-13 DIAGNOSIS — N92.0 MENORRHAGIA WITH REGULAR CYCLE: ICD-10-CM

## 2019-05-13 LAB
HCG URINE, QL. (POC): NEGATIVE
VALID INTERNAL CONTROL?: YES

## 2019-05-13 NOTE — PROGRESS NOTES
[facility]  OFFICE PROCEDURE PROGRESS NOTE        Chart reviewed for the following:   Arleen CAMPOS, have reviewed the History, Physical and updated the Allergic reactions for Jamir HICKEY Case     TIME OUT performed immediately prior to start of procedure:   Arleen CAMPOS, have performed the following reviews on Miriam HICKEY Case prior to the start of the procedure:            * Patient was identified by name and date of birth   * Agreement on procedure being performed was verified  * Risks and Benefits explained to the patient  * Procedure site verified and marked as necessary  * Patient was positioned for comfort  * Consent was signed and verified     Time:  1035am      Date of procedure: 2019    Procedure performed by:  Johanna Veliz MD    Patient assisted by: self    How tolerated by patient: tolerated the procedure well with no complications    Post Procedural Pain Scale: 2 - Hurts Little Bit    Comments: none      Mirena IUD INSERTION  Indications:  Hans Vizcarra is a ,  41 y.o. female [facility] [place] Patient's last menstrual period was 05/10/2019 (exact date). Her LMP was normal in duration and amount of flow. She  presents for insertion of an IUD. The risks, benefits and alternatives of IUD insertion were discussed in detail at last visit. She also has reviewed Mirena information. She has elected to proceed with the insertion today and she states she has no further questions. A urine pregnancy test was negative   Procedure: The pelvic exam revealed normal external genitalia. On bimanual exam the uterus was anteverted and normal in size with no tenderness present. A speculum was inserted into the vagina and the cervix was visualized. The cervix was prepped with zephiran solution. The anterior lip of the cervix was grasped with a single toothed tenaculum. The uterus was sounded with a Kwok sound to 9.5 centimeters. A Mirena was then inserted without difficulty.  The string was cut to 3 centimeters. She experienced a mild  amount of cramping. Post Procedure Status:   She tolerated the procedure with mild discomfort. The patient was observed for 10 minutes after the insertion. There were no complications. Patient was discharged in stable condition. The patient received Mirena lot number [id number].     Disc bleeding, expulsion, unfection  Fu in 4 weeks with ultrasound   has vasectomy

## 2019-05-13 NOTE — PATIENT INSTRUCTIONS
Intrauterine Device (IUD) Insertion: Care Instructions  Your Care Instructions    The intrauterine device (IUD) is a very effective method of birth control. It is a small, plastic, T-shaped device that contains copper or hormones. The doctor inserts the IUD into your uterus. A plastic string tied to the end of the IUD hangs down through the cervix into the vagina. There are two types of IUDs. The copper IUD is effective for up to 10 years. The hormonal IUD is effective for either 3 years or 5 years, depending on which IUD is used. The hormonal IUD also reduces menstrual bleeding and cramping. Both types of IUD damage or kill the man's sperm. This means that the woman's egg does not join with the sperm. IUDs also change the lining of the uterus so that the egg does not lodge there. The IUD is most likely to work well for women who have been pregnant before. Some women who have never been pregnant have more trouble keeping the IUD in the uterus. They also may have more pain and cramping after insertion. Follow-up care is a key part of your treatment and safety. Be sure to make and go to all appointments, and call your doctor if you are having problems. It's also a good idea to know your test results and keep a list of the medicines you take. How can you care for yourself at home? · You may experience some mild cramping and light bleeding (spotting) for 1 or 2 days. Use a hot water bottle or a heating pad set on low on your belly for pain. · Take an over-the-counter pain medicine, such as acetaminophen (Tylenol), ibuprofen (Advil, Motrin), and naproxen (Aleve) if needed. Read and follow all instructions on the label. · Do not take two or more pain medicines at the same time unless the doctor told you to. Many pain medicines have acetaminophen, which is Tylenol. Too much acetaminophen (Tylenol) can be harmful. · Check the string of your IUD after every period.  To do this, insert a finger into your vagina and feel for the cervix, which is at the top of the vagina and feels harder than the rest of your vagina. You should be able to feel the thin, plastic string coming out of the opening of your cervix. If you cannot feel the string, use another form of birth control and make an appointment with your doctor to have the string checked. · If the IUD comes out, save it and call your doctor. Be sure to use another form of birth control while the IUD is out. · Use latex condoms to protect against sexually transmitted infections (STIs), such as gonorrhea and chlamydia. An IUD does not protect you from STIs. Having one sex partner (who does not have STIs and does not have sex with anyone else) is a good way to avoid STIs. When should you call for help? Call 911 anytime you think you may need emergency care. For example, call if:    · You passed out (lost consciousness).     · You have sudden, severe pain in your belly or pelvis.    Call your doctor now or seek immediate medical care if:    · You have new belly or pelvic pain.     · You have severe vaginal bleeding. This means that you are soaking through your usual pads or tampons each hour for 2 or more hours.     · You are dizzy or lightheaded, or you feel like you may faint.     · You have a fever and pelvic pain or vaginal discharge.     · You have pelvic pain that is getting worse.    Watch closely for changes in your health, and be sure to contact your doctor if:    · You cannot feel the string, or the IUD comes out.     · You feel sick to your stomach, or you vomit.     · You think you may be pregnant. Where can you learn more? Go to http://giovanna-ankit.info/. Enter G324 in the search box to learn more about \"Intrauterine Device (IUD) Insertion: Care Instructions. \"  Current as of: September 5, 2018  Content Version: 11.9  © 3422-5726 [employer].  Care instructions adapted under license by [employer] (which disclaims liability or warranty for this information). If you have questions about a medical condition or this instruction, always ask your healthcare professional. [place] [address] any warranty or liability for your use of this information.

## 2019-06-10 ENCOUNTER — OFFICE VISIT (OUTPATIENT)
Dept: OBGYN CLINIC | Age: 44
End: 2019-06-10

## 2019-06-10 VITALS
SYSTOLIC BLOOD PRESSURE: 118 MMHG | WEIGHT: 138 LBS | HEIGHT: 62 IN | BODY MASS INDEX: 25.4 KG/M2 | DIASTOLIC BLOOD PRESSURE: 70 MMHG

## 2019-06-10 DIAGNOSIS — Z20.2 POSSIBLE EXPOSURE TO STD: ICD-10-CM

## 2019-06-10 DIAGNOSIS — Z30.431 IUD CHECK UP: Primary | ICD-10-CM

## 2019-06-10 NOTE — PROGRESS NOTES
This is a follow-up visit for Camille Pepe Case is a ,  41 y.o. female [facility] [place] whose had a Mirena IUD placed 4 weeks ago. She would like to discuss having the strings trimmed. Since the IUD placement, the patient has not had any unusual complaints. She has not had  non-menstrual bleeding. She has not had significant pain. She has had no fever. Associated signs and symptoms: she denies dyspareunia, expulsion, heavy bleeding, increased pain, fever, and pelvic pain. She is concerned  may be unfaithful and desires STD testing  Ultrasound was done today and revealed appropriate placement of the IUD in the endometrial cavity. UTERUS IS ANTEVERTED, NORMAL IN SIZE AND ECHOGENICITY. ENDOMETRIUM MEASURES 4-5MM IN THICKNESS. NO EVIDENCE OF MASS OR ABNORMALITY SEEN  WITHIN THE ENDOMETRIAL CAVITY. IUD IS SEEN IN THE PROPER POSITION WITHIN THE ENDOMETRIAL CAVITY IN THE UTERINE FUNDUS. RIGHT OVARY APPEARS WITHIN NORMAL LIMITS. LEFT OVARY APPEARS WITHIN NORMAL LIMITS. A FOLLICULAR CYST IS SEEN. NO FREE FLUID SEEN IN THE CDS. Past Medical History:   Diagnosis Date    Bulging disc     Family history of skin cancer     Fetal macrosomia in pregnancy     IUD (intrauterine device) in place 2019    Mirena placed 19    Scarring     Sun-damaged skin      Past Surgical History:   Procedure Laterality Date    HX  SECTION      2     Social History     Occupational History    Not on file   Tobacco Use    Smoking status: Never Smoker    Smokeless tobacco: Never Used   Substance and Sexual Activity    Alcohol use:  Yes     Alcohol/week: 0.6 - 1.2 oz     Types: 1 - 2 Glasses of wine per week    Drug use: No    Sexual activity: Yes     Partners: Male     Birth control/protection: IUD     Comment: partner w/ vasectomy     Family History   Problem Relation Age of Onset    Colon Cancer Mother     Cancer Mother 72        colon    Cancer Father         lung and liver    Alcohol abuse Father     Breast Cancer Maternal Aunt     Diabetes Maternal Aunt     Hypertension Maternal Aunt        Allergies   Allergen Reactions    Nuvaring [Etonogestrel-Ethinyl Estradiol] Hives    Adhesive Rash     Prior to Admission medications    Medication Sig Start Date End Date Taking? Authorizing Provider   venlafaxine (EFFEXOR) 37.5 mg tablet Take 1 Tab by mouth daily.  10/18/18  Yes Alverto Tsai MD   fluticasone (FLONASE) 50 mcg/actuation nasal spray USE ONE SPRAY IN EACH NOSTRIL TWICE A DAY AS NEEDED 1/22/18  Yes Alverto Tsai MD        Review of Systems: History obtained from the patient  Constitutional: negative for weight loss, fever, night sweats  Breast: negative for breast lumps, nipple discharge, galactorrhea  GI: negative for change in bowel habits, abdominal pain, black or bloody stools  : negative for frequency, dysuria, hematuria, vaginal discharge  MSK: negative for back pain, joint pain, muscle pain  Skin: negative for itching, rash, hives  Psych: negative for anxiety, depression, change in mood      Objective:  Visit Vitals  /70   Ht 5' 2\" (1.575 m)   Wt 138 lb (62.6 kg)   BMI 25.24 kg/m²       Physical Exam:   PHYSICAL EXAMINATION    Constitutional  · Appearance: well-nourished, well developed, alert, in no acute distress    Gastrointestinal  · Abdominal Examination: abdomen non-tender to palpation, normal bowel sounds, no masses present  · Liver and spleen: no hepatomegaly present, spleen not palpable  · Hernias: no hernias identified    Genitourinary  · External Genitalia: normal appearance for age, no discharge present, no tenderness present, no inflammatory lesions present, no masses present, no atrophy present  · Vagina: normal vaginal vault without central or paravaginal defects, no discharge present, no inflammatory lesions present, no masses present  · Bladder: non-tender to palpation  · Urethra: appears normal  · Cervix: normal with IUD string visible and appropriate length   · Uterus: normal size, shape and consistency  · Adnexa: no adnexal tenderness present, no adnexal masses present  · Perineum: perineum within normal limits, no evidence of trauma, no rashes or skin lesions present    Skin  · General Inspection: no rash, no lesions identified    Neurologic/Psychiatric  · Mental Status:  · Orientation: grossly oriented to person, place and time  · Mood and Affect: mood normal, affect appropriate    Assessment:   Doing well with Mirena  Possible STD exposure    Plan:   [facility], STD testing  AE

## 2019-06-10 NOTE — PATIENT INSTRUCTIONS
Female Reproductive Organs, Side View: Anatomy Sketch    Current as of: May 14, 2018  Content Version: 11.9  © 7888-1672 [employer]. Care instructions adapted under license by [employer] (which disclaims liability or warranty for this information). If you have questions about a medical condition or this instruction, always ask your healthcare professional. [place] [address] any warranty or liability for your use of this information.

## 2019-06-11 LAB
COMMENT, 144067: NORMAL
HBV SURFACE AG SERPL QL IA: NEGATIVE
HCV AB S/CO SERPL IA: <0.1 S/CO RATIO (ref 0–0.9)
HIV 1+2 AB+HIV1 P24 AG SERPL QL IA: NON REACTIVE
HSV2 IGG SER IA-ACNC: <0.91 INDEX (ref 0–0.9)
RPR SER QL: NON REACTIVE

## 2019-06-12 LAB
C TRACH RRNA SPEC QL NAA+PROBE: NEGATIVE
N GONORRHOEA RRNA SPEC QL NAA+PROBE: NEGATIVE
T VAGINALIS RRNA VAG QL NAA+PROBE: NEGATIVE

## 2019-07-10 ENCOUNTER — OFFICE VISIT (OUTPATIENT)
Dept: INTERNAL MEDICINE CLINIC | Age: 44
End: 2019-07-10

## 2019-07-10 VITALS
WEIGHT: 124 LBS | HEART RATE: 100 BPM | SYSTOLIC BLOOD PRESSURE: 100 MMHG | DIASTOLIC BLOOD PRESSURE: 72 MMHG | HEIGHT: 62 IN | BODY MASS INDEX: 22.82 KG/M2 | OXYGEN SATURATION: 98 % | TEMPERATURE: 98.3 F | RESPIRATION RATE: 16 BRPM

## 2019-07-10 DIAGNOSIS — Z01.818 PREOP EXAMINATION: Primary | ICD-10-CM

## 2019-07-10 RX ORDER — OXYCODONE AND ACETAMINOPHEN 5; 325 MG/1; MG/1
TABLET ORAL
COMMUNITY
Start: 2019-07-06 | End: 2019-12-12

## 2019-07-10 NOTE — PROGRESS NOTES
Pt is having surgery tomorrow morning. Pt is having full abdominoplasty, umbilical hernia repair, and breast augmentation.

## 2019-07-10 NOTE — PROGRESS NOTES
Madi Alcazar Case is a 41 y.o. female who presents today for Pre-op Exam  .      She has a history of   Patient Active Problem List   Diagnosis Code    Urinary frequency R35.0    Post partum depression O99.345, F53.0    Bunion, left foot M21.612    Chronic back pain M54.9, G89.29   . Today patient is here for preop evaluation. .     Procedure: Abdominoplasty, umbilical hernia and breast augmentation. Surgeon: Dr. David Austin. Anesthesia: General  Date: 7/10/2019    Has had two C-sections without any issues. She is very active and not limited by SOB or CP in any activities. Patient just recently lost her brother. She notes that overall she has good support she is doing okay. ROS  Review of Systems   Constitutional: Negative for chills, fever, malaise/fatigue and weight loss. Eyes: Negative for blurred vision, double vision, photophobia and pain. Respiratory: Negative for cough, hemoptysis and shortness of breath. Cardiovascular: Negative for chest pain, palpitations, orthopnea, claudication and leg swelling. Gastrointestinal: Negative for abdominal pain, constipation, diarrhea, heartburn, nausea and vomiting. Genitourinary: Negative for dysuria, frequency, hematuria and urgency. Musculoskeletal: Negative for back pain, myalgias and neck pain. Skin: Negative for itching and rash. Neurological: Negative. Endo/Heme/Allergies: Does not bruise/bleed easily. Psychiatric/Behavioral: Negative for depression. The patient is not nervous/anxious. Visit Vitals  /72 (BP 1 Location: Left arm, BP Patient Position: Sitting)   Pulse 100   Temp 98.3 °F (36.8 °C) (Oral)   Resp 16   Ht 5' 2\" (1.575 m)   Wt 124 lb (56.2 kg)   SpO2 98%   BMI 22.68 kg/m²       Physical Exam   Constitutional: She is oriented to person, place, and time. She appears well-developed and well-nourished. No distress. HENT:   Head: Normocephalic and atraumatic. Neck: Normal range of motion. Neck supple.  No thyromegaly present. Cardiovascular: Normal rate and regular rhythm. No murmur heard. Pulmonary/Chest: Effort normal and breath sounds normal. No respiratory distress. She has no wheezes. Abdominal: Soft. Bowel sounds are normal. She exhibits no distension. Reducible superumbilical hernia   Musculoskeletal: She exhibits no edema. Neurological: She is alert and oriented to person, place, and time. No cranial nerve deficit. Skin: Skin is warm and dry. Psychiatric: She has a normal mood and affect. Her behavior is normal.         Current Outpatient Medications   Medication Sig    oxyCODONE-acetaminophen (PERCOCET) 5-325 mg per tablet     fluticasone (FLONASE) 50 mcg/actuation nasal spray USE ONE SPRAY IN EACH NOSTRIL TWICE A DAY AS NEEDED    venlafaxine (EFFEXOR) 37.5 mg tablet Take 1 Tab by mouth daily. No current facility-administered medications for this visit. Past Medical History:   Diagnosis Date    Bulging disc     Family history of skin cancer     Fetal macrosomia in pregnancy     IUD (intrauterine device) in place 2019    Mirena placed 19    Scarring     Sun-damaged skin       Past Surgical History:   Procedure Laterality Date    HX  SECTION      2      Social History     Tobacco Use    Smoking status: Never Smoker    Smokeless tobacco: Never Used   Substance Use Topics    Alcohol use: Yes     Alcohol/week: 0.6 - 1.2 oz     Types: 1 - 2 Glasses of wine per week      Family History   Problem Relation Age of Onset    Colon Cancer Mother     Cancer Mother 72        colon    Cancer Father         lung and liver    Alcohol abuse Father     Breast Cancer Maternal Aunt     Diabetes Maternal Aunt     Hypertension Maternal Aunt         Allergies   Allergen Reactions    Nuvaring [Etonogestrel-Ethinyl Estradiol] Hives    Adhesive Rash        Assessment/Plan  Diagnoses and all orders for this visit:    1.  Preop examination -patient low risk for cardiovascular complications surrounding general anesthesia for her elective surgery tomorrow. No changes needed before proceeding with elective surgery. Antoine Hastings MD  7/10/2019    This note was created with the help of speech recognition software [employer]) and may contain some 'sound alike' errors.

## 2019-11-25 ENCOUNTER — HOSPITAL ENCOUNTER (EMERGENCY)
Age: 44
Discharge: HOME | End: 2019-11-25
Attending: STUDENT IN AN ORGANIZED HEALTH CARE EDUCATION/TRAINING PROGRAM
Payer: COMMERCIAL

## 2019-11-25 ENCOUNTER — APPOINTMENT (OUTPATIENT)
Dept: CT IMAGING | Age: 44
End: 2019-11-25
Attending: NURSE PRACTITIONER
Payer: COMMERCIAL

## 2019-11-25 VITALS
RESPIRATION RATE: 16 BRPM | HEART RATE: 85 BPM | TEMPERATURE: 98.2 F | BODY MASS INDEX: 21.9 KG/M2 | OXYGEN SATURATION: 99 % | DIASTOLIC BLOOD PRESSURE: 78 MMHG | WEIGHT: 119.71 LBS | SYSTOLIC BLOOD PRESSURE: 108 MMHG

## 2019-11-25 DIAGNOSIS — R51.9 ACUTE FACIAL PAIN: Primary | ICD-10-CM

## 2019-11-25 LAB
ANION GAP SERPL CALC-SCNC: 10 MMOL/L (ref 5–15)
BASOPHILS # BLD: 0 K/UL (ref 0–0.1)
BASOPHILS NFR BLD: 0 % (ref 0–1)
BUN SERPL-MCNC: 12 MG/DL (ref 6–20)
BUN/CREAT SERPL: 16 (ref 12–20)
CALCIUM SERPL-MCNC: 8.9 MG/DL (ref 8.5–10.1)
CHLORIDE SERPL-SCNC: 103 MMOL/L (ref 97–108)
CO2 SERPL-SCNC: 28 MMOL/L (ref 21–32)
CREAT SERPL-MCNC: 0.73 MG/DL (ref 0.55–1.02)
CRP SERPL-MCNC: <0.29 MG/DL (ref 0–0.6)
DIFFERENTIAL METHOD BLD: ABNORMAL
EOSINOPHIL # BLD: 0 K/UL (ref 0–0.4)
EOSINOPHIL NFR BLD: 0 % (ref 0–7)
ERYTHROCYTE [DISTWIDTH] IN BLOOD BY AUTOMATED COUNT: 13.1 % (ref 11.5–14.5)
ERYTHROCYTE [SEDIMENTATION RATE] IN BLOOD: 7 MM/HR (ref 0–20)
GLUCOSE SERPL-MCNC: 96 MG/DL (ref 65–100)
HCG UR QL: NEGATIVE
HCT VFR BLD AUTO: 42 % (ref 35–47)
HGB BLD-MCNC: 13.9 G/DL (ref 11.5–16)
LYMPHOCYTES # BLD: 1.8 K/UL (ref 0.8–3.5)
LYMPHOCYTES NFR BLD: 26 % (ref 12–49)
MCH RBC QN AUTO: 32.9 PG (ref 26–34)
MCHC RBC AUTO-ENTMCNC: 33.1 G/DL (ref 30–36.5)
MCV RBC AUTO: 99.3 FL (ref 80–99)
MONOCYTES # BLD: 0.8 K/UL (ref 0–1)
MONOCYTES NFR BLD: 11 % (ref 5–13)
NEUTS SEG # BLD: 4.3 K/UL (ref 1.8–8)
NEUTS SEG NFR BLD: 63 % (ref 32–75)
PLATELET # BLD AUTO: 186 K/UL (ref 150–400)
PMV BLD AUTO: 9.6 FL (ref 8.9–12.9)
POTASSIUM SERPL-SCNC: 3.5 MMOL/L (ref 3.5–5.1)
RBC # BLD AUTO: 4.23 M/UL (ref 3.8–5.2)
SODIUM SERPL-SCNC: 141 MMOL/L (ref 136–145)
WBC # BLD AUTO: 6.9 K/UL (ref 3.6–11)

## 2019-11-25 PROCEDURE — 96374 THER/PROPH/DIAG INJ IV PUSH: CPT

## 2019-11-25 PROCEDURE — 70450 CT HEAD/BRAIN W/O DYE: CPT

## 2019-11-25 PROCEDURE — 85652 RBC SED RATE AUTOMATED: CPT

## 2019-11-25 PROCEDURE — 36415 COLL VENOUS BLD VENIPUNCTURE: CPT

## 2019-11-25 PROCEDURE — 74011636637 HC RX REV CODE- 636/637: Performed by: NURSE PRACTITIONER

## 2019-11-25 PROCEDURE — 99284 EMERGENCY DEPT VISIT MOD MDM: CPT

## 2019-11-25 PROCEDURE — 86140 C-REACTIVE PROTEIN: CPT

## 2019-11-25 PROCEDURE — 74011250636 HC RX REV CODE- 250/636: Performed by: NURSE PRACTITIONER

## 2019-11-25 PROCEDURE — 85025 COMPLETE CBC W/AUTO DIFF WBC: CPT

## 2019-11-25 PROCEDURE — 80048 BASIC METABOLIC PNL TOTAL CA: CPT

## 2019-11-25 PROCEDURE — 81025 URINE PREGNANCY TEST: CPT

## 2019-11-25 RX ORDER — VALACYCLOVIR HYDROCHLORIDE 1 G/1
1000 TABLET, FILM COATED ORAL 3 TIMES DAILY
Qty: 21 TAB | Refills: 0 | Status: SHIPPED | OUTPATIENT
Start: 2019-11-25 | End: 2019-12-02

## 2019-11-25 RX ORDER — KETOROLAC TROMETHAMINE 30 MG/ML
15 INJECTION, SOLUTION INTRAMUSCULAR; INTRAVENOUS
Status: COMPLETED | OUTPATIENT
Start: 2019-11-25 | End: 2019-11-25

## 2019-11-25 RX ORDER — PREDNISONE 10 MG/1
TABLET ORAL
Qty: 21 TAB | Refills: 0 | Status: SHIPPED | OUTPATIENT
Start: 2019-11-25 | End: 2020-07-13 | Stop reason: ALTCHOICE

## 2019-11-25 RX ORDER — PREDNISONE 20 MG/1
40 TABLET ORAL
Status: COMPLETED | OUTPATIENT
Start: 2019-11-25 | End: 2019-11-25

## 2019-11-25 RX ADMIN — KETOROLAC TROMETHAMINE 15 MG: 30 INJECTION, SOLUTION INTRAMUSCULAR at 15:00

## 2019-11-25 RX ADMIN — PREDNISONE 40 MG: 20 TABLET ORAL at 18:49

## 2019-11-25 RX ADMIN — SODIUM CHLORIDE 1000 ML: 900 INJECTION, SOLUTION INTRAVENOUS at 18:54

## 2019-11-25 NOTE — ED TRIAGE NOTES
Pt ambulated to the treatment area with a steady gait. Pt states \"I have had a headache on the right side of my head since 2pm today. I noticed I have a protruding vein there. I put ice its a little better but still there. I am light sensitive. I took 2 advil at 2pm. It helped some but didn't make it go away. \" Pt denies nausea denies vomiting.

## 2019-11-25 NOTE — ED PROVIDER NOTES
Patient is a 79-year-old female with a past medical history significant for bulging spinal disc, sun damaged skin who is ambulatory to the ED today with complaints of right-sided headache since 2 PM today. Patient states she usually does not get headaches. She noted the \"a bulging or protruding vein on the right temporal area of her face\". She describes the pain as achy on the right side of her head with an occasional sharp stabbing pain. Notes nothing makes it better or worse. Has tried 2 Advil without relief of her symptoms. Denies fever, chills, nausea, vomiting, visual acuity change, eye pain, chest pain, shortness of breath or sensitivity to sound. No history of migraine. She notes mild photophobia in addition to her other findings. Patient has no further complaints at this time. Primary care provider:Bryon Tsai MD    The history is provided by the patient. No  was used.       Past Medical History:   Diagnosis Date    Bulging disc     Family history of skin cancer     Fetal macrosomia in pregnancy     IUD (intrauterine device) in place 2019    Mirena placed 19    Scarring     Sun-damaged skin      Past Surgical History:   Procedure Laterality Date    HX  SECTION      2       Family History:   Problem Relation Age of Onset    Colon Cancer Mother     Cancer Mother 72        colon   [place] Cancer Father         lung and liver    Alcohol abuse Father     Breast Cancer Maternal Aunt     Diabetes Maternal Aunt     Hypertension Maternal Aunt      Social History     Socioeconomic History    Marital status:      Spouse name: Not on file    Number of children: Not on file    Years of education: Not on file    Highest education level: Not on file   Occupational History    Not on file   Social Needs    Financial resource strain: Not on file    Food insecurity:     Worry: Not on file     Inability: Not on file    Transportation needs: Medical: Not on file     Non-medical: Not on file   Tobacco Use    Smoking status: Never Smoker    Smokeless tobacco: Never Used   Substance and Sexual Activity    Alcohol use: Yes     Alcohol/week: 1.0 - 2.0 standard drinks     Types: 1 - 2 Glasses of wine per week    Drug use: No    Sexual activity: Yes     Partners: Male     Birth control/protection: None     Comment: partner w/ vasectomy   Lifestyle    Physical activity:     Days per week: Not on file     Minutes per session: Not on file    Stress: Not on file   Relationships    Social connections:     Talks on phone: Not on file     Gets together: Not on file     Attends Judaism service: Not on file     Active member of club or organization: Not on file     Attends meetings of clubs or organizations: Not on file     Relationship status: Not on file    Intimate partner violence:     Fear of current or ex partner: Not on file     Emotionally abused: Not on file     Physically abused: Not on file     Forced sexual activity: Not on file   Other Topics Concern    Not on file   Social History Narrative    Not on file     ALLERGIES: Nuvaring [etonogestrel-ethinyl estradiol] and Adhesive    Review of Systems   Constitutional: Negative for activity change, appetite change, chills and fever. Eyes: Positive for photophobia. Respiratory: Negative for shortness of breath. Cardiovascular: Negative for chest pain. Gastrointestinal: Negative for nausea and vomiting. Neurological: Positive for headaches. Psychiatric/Behavioral: Negative. All other systems reviewed and are negative. Vitals:    11/25/19 1808   BP: 119/62   Pulse: 84   Resp: 14   Temp: 98.2 °F (36.8 °C)   SpO2: 98%   Weight: 54.3 kg (119 lb 11.4 oz)          Physical Exam  Vitals signs and nursing note reviewed. Constitutional:       Appearance: Normal appearance. She is well-developed and normal weight. She is not ill-appearing, toxic-appearing or diaphoretic.    HENT:      Head: Normocephalic and atraumatic. Jaw: There is normal jaw occlusion. Right Ear: Hearing, tympanic membrane, ear canal and external ear normal. No drainage, swelling or tenderness. No mastoid tenderness. Left Ear: Hearing, tympanic membrane, ear canal and external ear normal.      Ears:      Comments: Mild pain when pulling on the pinna on the right ear. Nose: Nose normal. No nasal tenderness. Comments: No lesion to the tip of the nose     Mouth/Throat:      Lips: Pink. Mouth: Mucous membranes are moist.      Tongue: No lesions. Pharynx: Oropharynx is clear. Uvula midline. No posterior oropharyngeal erythema or uvula swelling. Tonsils: No tonsillar exudate or tonsillar abscesses. Eyes:      General: Lids are normal.      Extraocular Movements: Extraocular movements intact. Right eye: Normal extraocular motion and no nystagmus. Left eye: Normal extraocular motion and no nystagmus. Conjunctiva/sclera: Conjunctivae normal.      Pupils: Pupils are equal, round, and reactive to light. Neck:      Musculoskeletal: Full passive range of motion without pain, normal range of motion and neck supple. Cardiovascular:      Rate and Rhythm: Normal rate and regular rhythm. Heart sounds: Normal heart sounds. Pulmonary:      Effort: Pulmonary effort is normal. No respiratory distress. Breath sounds: Normal breath sounds. No decreased breath sounds, wheezing or rales. Chest:      Chest wall: No tenderness. Abdominal:      General: Abdomen is flat. Bowel sounds are normal.      Palpations: Abdomen is soft. Tenderness: There is no tenderness. There is no right CVA tenderness, left CVA tenderness or guarding. Musculoskeletal: Normal range of motion. Lymphadenopathy:      Head:      Right side of head: No submental, submandibular, tonsillar or preauricular adenopathy. Left side of head: No submental, submandibular, tonsillar or preauricular adenopathy. Skin:     General: Skin is warm and dry. Findings: No erythema. Neurological:      Mental Status: She is alert and oriented to person, place, and time. GCS: GCS eye subscore is 4. GCS verbal subscore is 5. GCS motor subscore is 6. Cranial Nerves: Cranial nerves are intact. Sensory: Sensation is intact. No sensory deficit. Motor: Motor function is intact. No pronator drift. Coordination: Coordination is intact. Gait: Gait is intact. Comments: Non-focal neuro exam. Negative finger/nose and heel/shin. Psychiatric:         Behavior: Behavior normal. Behavior is cooperative. Thought Content: Thought content normal.         Judgment: Judgment normal.        MDM       Procedures      Assessment & Plan:     Differential diagnosis: Migraine, trigeminal neuralgia, early onset shingles, amaurosis fugax    Orders Placed This Encounter    CT HEAD WITHOUT CONTRAST    CBC WITH AUTOMATED DIFF    METABOLIC PANEL, BASIC    SED RATE (ESR)    C REACTIVE PROTEIN, QT    POC URINE PREGNANCY TEST    sodium chloride 0.9 % bolus infusion 1,000 mL    ketorolac (TORADOL) injection 15 mg    predniSONE (DELTASONE) tablet 40 mg       Discussed with Piotr Frederick DO,ED Provider    Kelton Dash NP  11/25/19  6:25 PM    Headache has improved. Labs and imaging not concerning. Will discharge home with steroid taper. Will need PCP follow-up. Likely trigeminal neuralgia but if rash develops could be shingles. Discussed return precautions. 8:13 PM  Patient re-evaluated. All questions answered. Patient appropriate for discharge. Given return precautions and follow up instructions.      LABORATORY TESTS:  Labs Reviewed   CBC WITH AUTOMATED DIFF - Abnormal; Notable for the following components:       Result Value    MCV 99.3 (*)     All other components within normal limits   METABOLIC PANEL, BASIC   SED RATE (ESR)   C REACTIVE PROTEIN, QT   HCG URINE, QL. - POC IMAGING RESULTS:  CT HEAD WO CONT   Final Result   IMPRESSION: No acute changes. MEDICATIONS GIVEN:  Medications   sodium chloride 0.9 % bolus infusion 1,000 mL (0 mL IntraVENous IV Completed 11/25/19 1949)   ketorolac (TORADOL) injection 15 mg (15 mg IntraVENous Given 11/25/19 1500)   predniSONE (DELTASONE) tablet 40 mg (40 mg Oral Given 11/25/19 1849)       IMPRESSION:  1. Acute facial pain        PLAN:  1. Discharge Medication List as of 11/25/2019  7:55 PM      START taking these medications    Details   predniSONE (DELTASONE) 10 mg tablet Take with breakfast daily on a taper: 4 tabs PO for 3 days then 2 tabs PO for 3 days then 1 tab PO for 3 days  Indications: trigeminal neuralgia vs early shingles, Print, Disp-21 Tab, R-0      valACYclovir (VALTREX) 1 gram tablet Take 1 Tab by mouth three (3) times daily for 7 days. Only if facial rash develops where you are having pain. Indications: shingles, Print, Disp-21 Tab, R-0         CONTINUE these medications which have NOT CHANGED    Details   fluticasone (FLONASE) 50 mcg/actuation nasal spray USE ONE SPRAY IN EACH NOSTRIL TWICE A DAY AS NEEDED, Normal, Disp-48 g, R-4      oxyCODONE-acetaminophen (PERCOCET) 5-325 mg per tablet Historical Med           2. Follow-up Information     Follow up With Specialties Details Why Contact Info    Teagan Tsai MD Internal Medicine Schedule an appointment as soon as possible for a visit  [address] [place] [address] 99 [phone]      [facility] DEPT Emergency Medicine  As needed, If symptoms worsen [address]  [phone]        3. Return to ED for new or worsening symptoms       Laz Lloyd NP        Please note that this dictation was completed with [employer], the [employer] voice recognition software.   Quite often unanticipated grammatical, syntax, homophones, and other interpretive errors are inadvertently transcribed by the computer software. Please disregard these errors. Please excuse any errors that have escaped final proofreading.

## 2019-11-26 NOTE — ED NOTES
Bedside and Verbal shift change report given to Laila Lorenzo (oncoming nurse) by Jenna Jean Baptiste (offgoing nurse). Report included the following information SBAR, Kardex, ED Summary, Intake/Output and MAR.

## 2019-11-26 NOTE — DISCHARGE INSTRUCTIONS
Thank you for allowing us to care for you today. Please follow-up with your Primary Care provider in the next 2-3 days if your symptoms do not improve. Plan for home:     Prednisone on a taper as directed. Take before lunch each day as directed. Taking too late in the day can cause a disruption in your sleep cycle. No NSAIDS like Naprosyn , Aleve, ibuprofen, Advil, Motrin while you are on Prednisone. Start Valacyclovir three times daily, BUT ONLY IF YOU DEVELOP A RASH IN THE AREA YOU ARE CURRENTLY HAVING PAIN! If you develop worsening rash, worsening vision or a lesion or pimple at the end of your nose you should return to the ER urgently. Follow-up with primary care in the next few days to continue to monitor your condition. Patient Education        Trigeminal Neuralgia: Care Instructions  Your Care Instructions    Trigeminal neuralgia is a problem with the large nerve that brings feeling to your face. It causes a sudden, sharp pain on one side of your face. Just touching your cheek or talking can set off shooting pain toward the ear, eye, or nostril. Living with this pain can be very hard. Some people have long periods when they do not have pain, and then it comes back. Some people have periods of pain often. But medicine or other treatment often can make the pain go away. If you keep having pain, surgery may help. This problem is also called tic douloureux (say \"tik doo-arleth-CHANCE\"). Follow-up care is a key part of your treatment and safety. Be sure to make and go to all appointments, and call your doctor if you are having problems. It's also a good idea to know your test results and keep a list of the medicines you take. How can you care for yourself at home? · Write down when you have pain and what you were doing when it started. Try to find what causes the pain. Being in a cold wind, yawning, or shaving are examples. Avoid or limit these triggers if you can. · Be safe with medicines.  Take your medicines exactly as prescribed. ? Your doctor may have prescribed medicines used to treat depression and seizures. They can reduce your pain, help you sleep better, and improve your mood. ? Call your doctor if you think you are having a problem with your medicine. You will get more details on the specific medicines your doctor prescribes. ? If you are not taking a prescription pain medicine, take an over-the-counter medicine such as acetaminophen (Tylenol), ibuprofen (Advil, Motrin), or naproxen (Aleve). Read and follow all instructions on the label. ? Do not take two or more pain medicines at the same time unless the doctor told you to. Many pain medicines have acetaminophen, which is Tylenol. Too much acetaminophen (Tylenol) can be harmful. · Reduce stress in your life. Ask your doctor about ways to relax. These may include breathing exercises and massage. · Think about joining a support group with other people who have this problem. These groups can give comfort and information about what to do to feel better. Your doctor can tell you how to find a support group. When should you call for help? Call your doctor now or seek immediate medical care if:    · You have severe pain that you can't control.    Watch closely for changes in your health, and be sure to contact your doctor if:    · You are not able to sleep because of the pain.     · You do not get better as expected. Where can you learn more? Go to http://giovanna-ankit.info/. Enter R378 in the search box to learn more about \"Trigeminal Neuralgia: Care Instructions. \"  Current as of: June 26, 2019  Content Version: 12.2  © 4673-1613 [employer]. Care instructions adapted under license by [employer] (which disclaims liability or warranty for this information).  If you have questions about a medical condition or this instruction, always ask your healthcare professional. [address] any warranty or liability for your use of this information. Patient Education        Shingles: Care Instructions  Your Care Instructions    Shingles (herpes zoster) causes pain and a blistered rash. The rash can appear anywhere on the body but will be on only one side of the body, the left or right. It will be in a band, a strip, or a small area. The pain can be very severe. Shingles can also cause tingling or itching in the area of the rash. The blisters scab over after a few days and heal in 2 to 4 weeks. Medicines can help you feel better and may help prevent more serious problems caused by shingles. Shingles is caused by the same virus that causes chickenpox. When you have chickenpox, the virus gets into your nerve roots and stays there (becomes dormant) long after you get over the chickenpox. If the virus becomes active again, it can cause shingles. Follow-up care is a key part of your treatment and safety. Be sure to make and go to all appointments, and call your doctor if you are having problems. It's also a good idea to know your test results and keep a list of the medicines you take. How can you care for yourself at home? · Be safe with medicines. Take your medicines exactly as prescribed. Call your doctor if you think you are having a problem with your medicine. Antiviral medicine helps you get better faster. · Try not to scratch or pick at the blisters. They will crust over and fall off on their own if you leave them alone. · Put cool, wet cloths on the area to relieve pain and itching. You can also use calamine lotion. Try not to use so much lotion that it cakes and is hard to get off. · Put cornstarch or baking soda on the sores to help dry them out so they heal faster. · Do not use thick ointment, such as petroleum jelly, on the sores. This will keep them from drying and healing. · To help remove loose crusts, soak them in tap water.  This can help decrease oozing, and dry and soothe the skin. · Take an over-the-counter pain medicine, such as acetaminophen (Tylenol), ibuprofen (Advil, Motrin), or naproxen (Aleve). Read and follow all instructions on the label. · Avoid close contact with people until the blisters have healed. It is very important for you to avoid contact with anyone who has never had chickenpox or the chickenpox vaccine. Pregnant women, young babies, and anyone else who has a hard time fighting infection (such as someone with HIV, diabetes, or cancer) is especially at risk. When should you call for help? Call your doctor now or seek immediate medical care if:    · You have a new or higher fever.     · You have a severe headache and a stiff neck.     · You lose the ability to think clearly.     · The rash spreads to your forehead, nose, eyes, or eyelids.     · You have eye pain, or your vision gets worse.     · You have new pain in your face, or you cannot move the muscles in your face.     · Blisters spread to new parts of your body.    Watch closely for changes in your health, and be sure to contact your doctor if:    · The rash has not healed after 2 to 4 weeks.     · You still have pain after the rash has healed. Where can you learn more? Go to http://giovanna-anikt.info/. Yesenia Cowan in the search box to learn more about \"Shingles: Care Instructions. \"  Current as of: June 9, 2019  Content Version: 12.2  © 5139-0343 [employer]. Care instructions adapted under license by [employer] (which disclaims liability or warranty for this information). If you have questions about a medical condition or this instruction, always ask your healthcare professional. [address] any warranty or liability for your use of this information.

## 2019-12-12 ENCOUNTER — OFFICE VISIT (OUTPATIENT)
Dept: OBGYN CLINIC | Age: 44
End: 2019-12-12

## 2019-12-12 VITALS
DIASTOLIC BLOOD PRESSURE: 48 MMHG | SYSTOLIC BLOOD PRESSURE: 124 MMHG | BODY MASS INDEX: 22.26 KG/M2 | HEIGHT: 62 IN | WEIGHT: 121 LBS

## 2019-12-12 DIAGNOSIS — N92.0 MENORRHAGIA WITH REGULAR CYCLE: ICD-10-CM

## 2019-12-12 DIAGNOSIS — Z30.430 ENCOUNTER FOR IUD INSERTION: ICD-10-CM

## 2019-12-12 DIAGNOSIS — Z01.419 ENCOUNTER FOR GYNECOLOGICAL EXAMINATION WITHOUT ABNORMAL FINDING: Primary | ICD-10-CM

## 2019-12-12 DIAGNOSIS — Z12.39 SCREENING FOR BREAST CANCER: ICD-10-CM

## 2019-12-12 LAB
HCG URINE, QL. (POC): NEGATIVE
VALID INTERNAL CONTROL?: YES

## 2019-12-12 NOTE — PROGRESS NOTES
Panda Reyes Case is a ,  45 y.o. female [facility] [place] whose LMP was on 2019 who presents for her annual checkup. She is having heavy periods and wants another IUD. Menstrual status:    Her periods are heavy in flow. She is using more than ten pads or tampons per day, usually regular, she pulled out her Mirena in October. The string was hanging out    She denies dysmenorrhea. She reports no premenstrual symptoms. The patient is not using HRT. Contraception:    The current method of family planning is none. Sexual history:    She  reports being sexually active and has had partner(s) who are Male. She reports using the following method of birth control/protection: None. Medical conditions:    Since her last annual GYN exam about 6/15/2018 ago, she has had the following changes in her health history: none. Pap and Mammogram History:    Her most recent Pap smear was normal/-HPV obtained 2016 year(s) ago. The patient will go to [place] for mammogram, order placed and given to patient. .    Breast Cancer History/Substance Abuse:    She has a family history of breast cancer. Osteoporosis History:    Family history does not include a first or second degree relative with osteopenia or osteoporosis. A bone density scan was not obtained. She is currently not taking calcium and vit D.       Past Medical History:   Diagnosis Date    Bulging disc     Family history of skin cancer     Fetal macrosomia in pregnancy     IUD (intrauterine device) in place 2019    Mirena placed 19    Scarring     Sun-damaged skin      Past Surgical History:   Procedure Laterality Date    HX  SECTION      2     Current Outpatient Medications   Medication Sig Dispense Refill    predniSONE (DELTASONE) 10 mg tablet Take with breakfast daily on a taper: 4 tabs PO for 3 days then 2 tabs PO for 3 days then 1 tab PO for 3 days  Indications: trigeminal neuralgia vs early shingles 21 Tab 0    fluticasone (FLONASE) 50 mcg/actuation nasal spray USE ONE SPRAY IN EACH NOSTRIL TWICE A DAY AS NEEDED 48 g 4    oxyCODONE-acetaminophen (PERCOCET) 5-325 mg per tablet        Allergies: Nuvaring [etonogestrel-ethinyl estradiol] and Adhesive   Social History     Socioeconomic History    Marital status:      Spouse name: Not on file    Number of children: Not on file    Years of education: Not on file    Highest education level: Not on file   Occupational History    Not on file   Social Needs    Financial resource strain: Not on file    Food insecurity:     Worry: Not on file     Inability: Not on file    Transportation needs:     Medical: Not on file     Non-medical: Not on file   Tobacco Use    Smoking status: Never Smoker    Smokeless tobacco: Never Used   Substance and Sexual Activity    Alcohol use: Yes     Alcohol/week: 1.0 - 2.0 standard drinks     Types: 1 - 2 Glasses of wine per week    Drug use: No    Sexual activity: Yes     Partners: Male     Birth control/protection: None     Comment: partner w/ vasectomy   Lifestyle    Physical activity:     Days per week: Not on file     Minutes per session: Not on file    Stress: Not on file   Relationships    Social connections:     Talks on phone: Not on file     Gets together: Not on file     Attends Rastafari service: Not on file     Active member of club or organization: Not on file     Attends meetings of clubs or organizations: Not on file     Relationship status: Not on file    Intimate partner violence:     Fear of current or ex partner: Not on file     Emotionally abused: Not on file     Physically abused: Not on file     Forced sexual activity: Not on file   Other Topics Concern    Not on file   Social History Narrative    Not on file     Tobacco History:  reports that she has never smoked.  She has never used smokeless tobacco.  Alcohol Abuse:  reports current alcohol use of about 1.0 - 2.0 standard drinks of alcohol per week. Drug Abuse:  reports no history of drug use.   Patient Active Problem List   Diagnosis Code    Urinary frequency R35.0    Post partum depression O99.345, F53.0    Bunion, left foot M21.612    Chronic back pain M54.9, G89.29         Review of Systems - History obtained from the patient  Constitutional: negative for weight loss, fever, night sweats  HEENT: negative for hearing loss, earache, congestion, snoring, sorethroat  CV: negative for chest pain, palpitations, edema  Resp: negative for cough, shortness of breath, wheezing  GI: negative for change in bowel habits, abdominal pain, black or bloody stools  : negative for frequency, dysuria, hematuria, vaginal discharge  MSK: negative for back pain, joint pain, muscle pain  Breast: negative for breast lumps, nipple discharge, galactorrhea  Skin :negative for itching, rash, hives  Neuro: negative for dizziness, headache, confusion, weakness  Psych: negative for anxiety, depression, change in mood  Heme/lymph: negative for bleeding, bruising, pallor    Physical Exam    Visit Vitals  /48   Ht 5' 2\" (1.575 m)   Wt 121 lb (54.9 kg)   LMP 12/11/2019 (Approximate)   BMI 22.13 kg/m²     Constitutional  · Appearance: well-nourished, well developed, alert, in no acute distress    HENT  · Head and Face: appears normal    Neck  · Inspection/Palpation: normal appearance, no masses or tenderness  · Lymph Nodes: no lymphadenopathy present  · Thyroid: gland size normal, nontender, no nodules or masses present on palpation    Chest  · Respiratory Effort: breathing normal  · Auscultation: normal breath sounds    Cardiovascular  · Heart:  · Auscultation: regular rate and rhythm without murmur    Breasts  · Inspection of Breasts: breasts symmetrical, no skin changes, no discharge present, nipple appearance normal, no skin retraction present  · Palpation of Breasts and Axillae: no masses present on palpation, no breast tenderness  · Axillary Lymph Nodes: no lymphadenopathy present    Gastrointestinal  · Abdominal Examination: abdomen non-tender to palpation, normal bowel sounds, no masses present  · Liver and spleen: no hepatomegaly present, spleen not palpable  · Hernias: no hernias identified    Skin  · General Inspection: no rash, no lesions identified    Neurologic/Psychiatric  · Mental Status:  · Orientation: grossly oriented to person, place and time  · Mood and Affect: mood normal, affect appropriate    Genitourinary  · External Genitalia: normal appearance for age, no discharge present, no tenderness present, no inflammatory lesions present, no masses present, no atrophy present  · Vagina: normal vaginal vault without central or paravaginal defects, no discharge present, no inflammatory lesions present, no masses present  · Bladder: non-tender to palpation  · Urethra: appears normal  · Cervix: normal   · Uterus: normal size, shape and consistency  · Adnexa: no adnexal tenderness present, no adnexal masses present  · Perineum: perineum within normal limits, no evidence of trauma, no rashes or skin lesions present  · Anus: anus within normal limits, no hemorrhoids present  · Inguinal Lymph Nodes: no lymphadenopathy present    Assessment:  Routine gynecologic examination  Her current medical status is satisfactory with no evidence of significant gynecologic issues.     Plan:  Counseled re: diet, exercise, healthy lifestyle  Return for yearly wellness visits  Rec annual mammogram  Replace Mirena today

## 2019-12-12 NOTE — PROGRESS NOTES
[facility]  OFFICE PROCEDURE PROGRESS NOTE        Chart reviewed for the following:   Scott CAMPOS LPN, have reviewed the History, Physical and updated the Allergic reactions for Cami HICKEY Case     TIME OUT performed immediately prior to start of procedure:   Caryl CAMPOS LPN, have performed the following reviews on Miriam HICKEY Case prior to the start of the procedure:            * Patient was identified by name and date of birth   * Agreement on procedure being performed was verified  * Risks and Benefits explained to the patient  * Procedure site verified and marked as necessary  * Patient was positioned for comfort  * Consent was signed and verified     Time: 1000      Date of procedure: 2019    Procedure performed by:  Rosaria Alpers, MD    How tolerated by patient: tolerated the procedure well with no complications    Post Procedural Pain Scale: 2 - Hurts Little Bit    Comments: none      Mirena IUD INSERTION  Indications:  Cami Vizcarra is a ,  45 y.o. female [facility][note 3] Patient's last menstrual period was 2019 (approximate). Her LMP was normal in duration and amount of flow. She  presents for insertion of an IUD. The risks, benefits and alternatives of IUD insertion were discussed in detail at last visit. She also has reviewed Mirena information. She has elected to proceed with the insertion today and she states she has no further questions. A urine pregnancy test was negative   Procedure: The pelvic exam revealed normal external genitalia. On bimanual exam the uterus was midposition and normal in size with no tenderness present. A speculum was inserted into the vagina and the cervix was visualized. The cervix was prepped with zephiran solution. The anterior lip of the cervix was grasped with a single toothed tenaculum. The uterus was sounded with a Kwok sound to 10 centimeters. A Mirena was then inserted without difficulty.  The string was cut to 3 centimeters. She experienced a mild  amount of cramping. Post Procedure Status:   She tolerated the procedure with mild discomfort. The patient was observed for 10 minutes after the insertion. There were no complications. Patient was discharged in stable condition. The patient received Mirena lot number [id number].   Disc bleeding, infection, expulsion  Fu in 4 weeks with ultrasound

## 2019-12-23 ENCOUNTER — HOSPITAL ENCOUNTER (OUTPATIENT)
Dept: MAMMOGRAPHY | Age: 44
Discharge: HOME | End: 2019-12-23
Attending: OBSTETRICS & GYNECOLOGY
Payer: COMMERCIAL

## 2019-12-23 DIAGNOSIS — Z01.419 ENCOUNTER FOR GYNECOLOGICAL EXAMINATION WITHOUT ABNORMAL FINDING: ICD-10-CM

## 2019-12-23 DIAGNOSIS — Z12.39 SCREENING FOR BREAST CANCER: ICD-10-CM

## 2019-12-23 PROCEDURE — 77063 BREAST TOMOSYNTHESIS BI: CPT

## 2020-07-13 ENCOUNTER — VIRTUAL VISIT (OUTPATIENT)
Dept: INTERNAL MEDICINE CLINIC | Age: 45
End: 2020-07-13

## 2020-07-13 DIAGNOSIS — F41.9 ANXIETY: Primary | ICD-10-CM

## 2020-07-13 RX ORDER — SERTRALINE HYDROCHLORIDE 50 MG/1
50 TABLET, FILM COATED ORAL DAILY
Qty: 30 TAB | Refills: 2 | Status: SHIPPED | OUTPATIENT
Start: 2020-07-13 | End: 2020-12-22 | Stop reason: SDUPTHER

## 2020-07-13 NOTE — PROGRESS NOTES
HISTORY OF PRESENT ILLNESS  Melissa Vizcarra is a 45 y.o. female who is present, aware, and consenting for real-time synchronous virtual video visit through [employer]. HPI  Anxiety: Followed by counselor. Pt states that she is getting divorce and she has been experiencing severe anxiety. She notes some weight loss and insomnia. 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 She says her  is no longer paying the mortgage. Pt is continuing to work as a . Confirms panic attacks. Of note, pt's mother tolerates Paxil. Pt has complied with Lexapro and Wellbutrin in the past.     Review of Systems   Psychiatric/Behavioral: The patient is nervous/anxious and has insomnia. All other systems reviewed and are negative. Physical Exam  Vitals signs reviewed. Constitutional:       General: She is not in acute distress. Appearance: Normal appearance. She is not ill-appearing, toxic-appearing or diaphoretic. HENT:      Right Ear: Hearing normal.      Left Ear: Hearing normal.      Nose: Nose normal.      Mouth/Throat:      Mouth: Mucous membranes are moist.      Pharynx: Oropharynx is clear. Eyes:      Conjunctiva/sclera: Conjunctivae normal.   Neck:      Musculoskeletal: Normal range of motion. Pulmonary:      Effort: No respiratory distress. Breath sounds: Normal air entry. Musculoskeletal: Normal range of motion. Skin:     General: Skin is warm and dry. Neurological:      General: No focal deficit present. Mental Status: She is alert and oriented to person, place, and time. Mental status is at baseline. Psychiatric:         Mood and Affect: Mood normal.         Behavior: Behavior normal.         Thought Content: Thought content normal.         Judgment: Judgment normal.         ASSESSMENT and PLAN  1. Anxiety    Not stable. Rx Zoloft. Directed pt to comply with half tablet for 5 days and then comply with full tablet.  Informed pt that all mood medications have potential for weight gain. Asked pt to f/u in 3-4 weeks. Will continue to monitor for improvements or changes. Over 50% of the 25 minutes face to face with Cami HICKEY Case consisted of counseling and/or discussing treatment plans in reference to her anxiety and management and medication . Lab results and schedule of future lab studies reviewed with patient. Reviewed diet, exercise and weight control. Written by Lyssa Vogel, as dictated by Brenda Mc MD.     Current diagnosis and concerns discussed with pt at length. Understands risks and benefits or current treatment plan and medications and accepts the treatment and medication with any possible risks. Pt asks appropriate questions which were answered. Pt instructed to call with any concerns or problems.

## 2020-07-21 ENCOUNTER — TELEPHONE (OUTPATIENT)
Dept: OBGYN CLINIC | Age: 45
End: 2020-07-21

## 2020-07-21 NOTE — TELEPHONE ENCOUNTER
Requesting medical records. History of yeast in 2017. Wants records for .       Med records request form faxed to patient at [phone] attention Rex Vizcarra

## 2020-08-25 ENCOUNTER — TELEPHONE (OUTPATIENT)
Dept: OBGYN CLINIC | Age: 45
End: 2020-08-25

## 2020-08-25 RX ORDER — FLUCONAZOLE 150 MG/1
150 TABLET ORAL DAILY
Qty: 1 TAB | Refills: 0 | Status: SHIPPED | OUTPATIENT
Start: 2020-08-25 | End: 2020-08-26

## 2020-08-25 NOTE — TELEPHONE ENCOUNTER
Call received at 853am    39year old patient last seen in the office on 12/12/2019     Patient calling to say that she has a history of yeast infections is the past.    Patient states she went to the beach and is currently on day 2 of 3 day monistat and is still feeling like the symptoms are not going away and that she usually needs the diflucan pill as well to get rid of the yeast     Patient reports symptoms of burning.       [facility] , [facility]    Please advise

## 2020-09-04 ENCOUNTER — TELEPHONE (OUTPATIENT)
Dept: OBGYN CLINIC | Age: 45
End: 2020-09-04

## 2020-09-04 RX ORDER — TERCONAZOLE 8 MG/G
CREAM VAGINAL
Qty: 20 G | Refills: 0 | Status: SHIPPED | OUTPATIENT
Start: 2020-09-04 | End: 2020-12-22 | Stop reason: ALTCHOICE

## 2020-09-04 RX ORDER — FLUCONAZOLE 150 MG/1
TABLET ORAL
Qty: 1 TAB | Refills: 0 | Status: CANCELLED | OUTPATIENT
Start: 2020-09-04

## 2020-09-04 NOTE — TELEPHONE ENCOUNTER
Patient advised of MD recommendations and prescription sent by MD .    Patient verbalized understanding.

## 2020-09-04 NOTE — TELEPHONE ENCOUNTER
Call received at 510am    39year old patient last seen for ae on 12/12/2019      Patient was prescribed on diflucan for yeast infection on 8/25/2020    Patient calling today to say that she is still having trouble getting rid of the yeast infection. Patient reports trying another round of otc Monistat after the diflucan and she is still having yeast infection symptoms of burning.         Patient has history of reoccurring yeast infections and getting to clear up      Rx pended    Please amend /sign

## 2020-09-22 ENCOUNTER — OFFICE VISIT (OUTPATIENT)
Dept: OBGYN CLINIC | Age: 45
End: 2020-09-22
Payer: COMMERCIAL

## 2020-09-22 ENCOUNTER — TELEPHONE (OUTPATIENT)
Dept: OBGYN CLINIC | Age: 45
End: 2020-09-22

## 2020-09-22 VITALS
BODY MASS INDEX: 23.63 KG/M2 | HEIGHT: 62 IN | SYSTOLIC BLOOD PRESSURE: 117 MMHG | DIASTOLIC BLOOD PRESSURE: 64 MMHG | WEIGHT: 128.4 LBS

## 2020-09-22 DIAGNOSIS — N89.8 VAGINAL ITCHING: Primary | ICD-10-CM

## 2020-09-22 PROCEDURE — 99213 OFFICE O/P EST LOW 20 MIN: CPT | Performed by: OBSTETRICS & GYNECOLOGY

## 2020-09-22 RX ORDER — NYSTATIN AND TRIAMCINOLONE ACETONIDE 100000; 1 [USP'U]/G; MG/G
CREAM TOPICAL 2 TIMES DAILY
Qty: 30 G | Refills: 2 | Status: SHIPPED | OUTPATIENT
Start: 2020-09-22 | End: 2020-12-22 | Stop reason: SDUPTHER

## 2020-09-22 NOTE — TELEPHONE ENCOUNTER
Call received at 840am    39year old patient last seen in the office on 12/12/2019 for AE    Patient has recently treated for yeast infection and starting on Thursday 9/17/2020 with burning with urination and irritation. Patient was placed on the schedule to be seen today at 11:20am    Patient verbalized understanding.     GS advised of the add on appointment

## 2020-09-22 NOTE — PROGRESS NOTES
Vaginitis evaluation    Chief Complaint   Vaginitis      HPI  39 y.o. female complains vaginal area being red, irritated and has small amount of discharge, burning with urination. Patient states she was treated previously with diflucan and terazol cream but symptoms have not improved  No LMP recorded. (Menstrual status: IUD). She denies additional symptoms at this time. The patient denies aggravating factors. She is not concerned about possible STI exposure at this time. She denies exposure to new chemicals ot hygenic agents  Previous treatment included: none    New partner last 6 months. Finished terazol over a week ago - maybe better initially but still itching    Past Medical History:   Diagnosis Date    Bulging disc     Family history of skin cancer     Fetal macrosomia in pregnancy     IUD (intrauterine device) in place 2019    Mirena placed 19    Scarring     Sun-damaged skin      Past Surgical History:   Procedure Laterality Date    HX  SECTION      2    IMPLANT BREAST SILICONE/EQ      3272     Social History     Occupational History    Not on file   Tobacco Use    Smoking status: Never Smoker    Smokeless tobacco: Never Used   Substance and Sexual Activity    Alcohol use: Yes     Alcohol/week: 1.0 - 2.0 standard drinks     Types: 1 - 2 Glasses of wine per week    Drug use: No    Sexual activity: Yes     Partners: Male     Birth control/protection: None     Comment: partner w/ vasectomy     Family History   Problem Relation Age of Onset    Colon Cancer Mother     Cancer Mother 72        colon    Cancer Father         lung and liver    Alcohol abuse Father     Breast Cancer Maternal Aunt     Diabetes Maternal Aunt     Hypertension Maternal Aunt         Allergies   Allergen Reactions    Nuvaring [Etonogestrel-Ethinyl Estradiol] Hives    Adhesive Rash     Prior to Admission medications    Medication Sig Start Date End Date Taking?  Authorizing Provider sertraline (ZOLOFT) 50 mg tablet Take 1 Tab by mouth daily.  7/13/20  Yes Miah Tsai MD   fluticasone (FLONASE) 50 mcg/actuation nasal spray USE ONE SPRAY IN EACH NOSTRIL TWICE A DAY AS NEEDED 1/22/18  Yes Miah Tsai MD   terconazole (TERAZOL 3) 0.8 % vaginal cream Insert applicator per vagina for 3 nights 9/4/20   Ezekiel Benoit MD                      Review of Systems - History obtained from the patient  Constitutional: negative for weight loss, fever, night sweats  Breast: negative for breast lumps, nipple discharge, galactorrhea  GI: negative for change in bowel habits, abdominal pain, black or bloody stools  : negative for frequency, dysuria, hematuria  MSK: negative for back pain, joint pain, muscle pain  Skin: negative for itching, rash, hives  Neuro: negative for dizziness, headache, confusion, weakness  Psych: negative for anxiety, depression, change in mood  Heme/lymph: negative for bleeding, bruising, pallor       Objective:    Visit Vitals  /64   Ht 5' 2\" (1.575 m)   Wt 128 lb 6.4 oz (58.2 kg)   BMI 23.48 kg/m²       Physical Exam:   PHYSICAL EXAMINATION    Constitutional  · Appearance: well-nourished, well developed, alert, in no acute distress    HENT  · Head and Face: appears normal    Genitourinary  · External Genitalia: normal appearance for age, no discharge present, no tenderness present, no inflammatory lesions present, no masses present, no atrophy present, redness present  · Vagina:  white discharge present, otherwise normal vaginal vault without central or paravaginal defects, no inflammatory lesions present, no masses present  · Bladder: non-tender to palpation  · Urethra: appears normal  · Cervix: normal   · Uterus: normal size, shape and consistency  · Adnexa: no adnexal tenderness present, no adnexal masses present  · Perineum: perineum within normal limits, no evidence of trauma, no rashes or skin lesions present  · Anus: anus within normal limits, no hemorrhoids present  · Inguinal Lymph Nodes: no lymphadenopathy present    Skin  · General Inspection: no rash, no lesions identified    Neurologic/Psychiatric  · Mental Status:  · Orientation: grossly oriented to person, place and time  · Mood and Affect: mood normal, affect appropriate      . Assessment:   Vulvar itching  Hx of chronic yeast    Plan:   mycolog externally  nuswab plus - r/o glabrata, may need boric acid supp    ROV prn if symptoms persist or worsen.

## 2020-09-22 NOTE — PATIENT INSTRUCTIONS
Vaginitis: Care Instructions Your Care Instructions Vaginitis is soreness or infection of the vagina. This common problem can cause itching and burning. And it can cause a change in vaginal discharge. Sometimes it can cause pain during sex. Vaginitis may be caused by bacteria, yeast, or other germs. Some infections that cause it are caught from a sexual partner. Bath products, spermicides, and douches can irritate the vagina too. Some women have this problem during and after menopause. A drop in estrogen levels during this time can cause dryness, soreness, and pain during sex. Your doctor can give you medicine to treat an infection. And home care may help you feel better. For certain types of infections, your sex partner must be treated too. Follow-up care is a key part of your treatment and safety. Be sure to make and go to all appointments, and call your doctor if you are having problems. It's also a good idea to know your test results and keep a list of the medicines you take. How can you care for yourself at home? · If your doctor prescribed antibiotics, take them as directed. Do not stop taking them just because you feel better. You need to take the full course of antibiotics. · Take your medicines exactly as prescribed. Call your doctor if you think you are having a problem with your medicine. · Do not eat or drink anything that has alcohol if you are taking metronidazole (Flagyl). · If you have a yeast infection, use over-the-counter products as your doctor tells you to. Or take medicine your doctor prescribes exactly as directed. · Wash your vaginal area daily with water. You also can use a mild, unscented soap if you want. · Do not use scented bath products. And do not use vaginal sprays or douches. · Put a washcloth soaked in cool water on the area to relieve itching. Or you can take cool baths.  
· If you have dryness because of menopause, use estrogen cream or pills that your doctor prescribes. · Ask your doctor about when it is okay to have sex. · Use a personal lubricant before sex if you have dryness. Examples are Astroglide, K-Y Jelly, and Wet Lubricant Gel. · Ask your doctor if your sex partner also needs treatment. When should you call for help? Call your doctor now or seek immediate medical care if: 
  · You have a fever and pelvic pain. Watch closely for changes in your health, and be sure to contact your doctor if: 
  · You have bleeding other than your period.  
  · You do not get better as expected. Where can you learn more? Go to http://giovanna-ankit.info/ Enter E430 in the search box to learn more about \"Vaginitis: Care Instructions. \" Current as of: November 8, 2019               Content Version: 12.6 © 6356-7431 [employer]. Care instructions adapted under license by [employer] (which disclaims liability or warranty for this information). If you have questions about a medical condition or this instruction, always ask your healthcare professional. [address] any warranty or liability for your use of this information.

## 2020-09-25 LAB
A VAGINAE DNA VAG QL NAA+PROBE: ABNORMAL SCORE
BVAB2 DNA VAG QL NAA+PROBE: ABNORMAL SCORE
C ALBICANS DNA VAG QL NAA+PROBE: NEGATIVE
C GLABRATA DNA VAG QL NAA+PROBE: NEGATIVE
C TRACH DNA VAG QL NAA+PROBE: NEGATIVE
MEGA1 DNA VAG QL NAA+PROBE: ABNORMAL SCORE
N GONORRHOEA DNA VAG QL NAA+PROBE: NEGATIVE
T VAGINALIS DNA VAG QL NAA+PROBE: NEGATIVE

## 2020-09-25 RX ORDER — METRONIDAZOLE 500 MG/1
500 TABLET ORAL 2 TIMES DAILY
Qty: 14 TAB | Refills: 0 | Status: SHIPPED | OUTPATIENT
Start: 2020-09-25 | End: 2020-10-02

## 2020-12-08 ENCOUNTER — TRANSCRIBE ORDER (OUTPATIENT)
Dept: SCHEDULING | Age: 45
End: 2020-12-08

## 2020-12-08 DIAGNOSIS — Z12.31 BREAST CANCER SCREENING BY MAMMOGRAM: Primary | ICD-10-CM

## 2020-12-22 ENCOUNTER — TELEPHONE (OUTPATIENT)
Dept: INTERNAL MEDICINE CLINIC | Age: 45
End: 2020-12-22

## 2020-12-22 NOTE — TELEPHONE ENCOUNTER
I spoke with patient, she has sinus sx x 1 week. She went to [facility] and [facility] neither locations were open. VV schedule with NP today.  Pt was thankful

## 2020-12-22 NOTE — TELEPHONE ENCOUNTER
----- Message from Kajal Marrero sent at 12/22/2020 12:48 PM EST -----  Regarding: Dr Reggie Newell first and last name: Pt  Reason for call: no appt avail VV - pt w/ sinus/ear inf  Callback required yes/no and why: Yes, or call in Rx for pat  Best contact number(s): [phone]   Details to clarify the request: pt states that \"augmentin and omnisef\" work well for her ear/sinus inf. Pt is not feeling well and would like a VV appt today if pos, something called in or both.

## 2021-01-12 ENCOUNTER — HOSPITAL ENCOUNTER (OUTPATIENT)
Dept: MAMMOGRAPHY | Age: 46
Discharge: HOME | End: 2021-01-12
Attending: OBSTETRICS & GYNECOLOGY
Payer: COMMERCIAL

## 2021-01-12 DIAGNOSIS — Z12.31 BREAST CANCER SCREENING BY MAMMOGRAM: ICD-10-CM

## 2021-01-12 PROCEDURE — 77063 BREAST TOMOSYNTHESIS BI: CPT

## 2021-03-11 DIAGNOSIS — Z87.42 H/O VAGINITIS: ICD-10-CM

## 2021-03-11 DIAGNOSIS — J01.41 ACUTE RECURRENT PANSINUSITIS: ICD-10-CM

## 2021-03-14 RX ORDER — FLUCONAZOLE 150 MG/1
TABLET ORAL
Qty: 2 TAB | Refills: 0 | OUTPATIENT
Start: 2021-03-14

## 2021-04-16 ENCOUNTER — OFFICE VISIT (OUTPATIENT)
Dept: OBGYN CLINIC | Age: 46
End: 2021-04-16
Payer: COMMERCIAL

## 2021-04-16 VITALS — BODY MASS INDEX: 23.08 KG/M2 | WEIGHT: 126.2 LBS | SYSTOLIC BLOOD PRESSURE: 131 MMHG | DIASTOLIC BLOOD PRESSURE: 75 MMHG

## 2021-04-16 DIAGNOSIS — N89.8 VAGINAL ODOR: ICD-10-CM

## 2021-04-16 DIAGNOSIS — N76.0 VAGINITIS AND VULVOVAGINITIS: Primary | ICD-10-CM

## 2021-04-16 PROCEDURE — 99213 OFFICE O/P EST LOW 20 MIN: CPT | Performed by: OBSTETRICS & GYNECOLOGY

## 2021-04-16 RX ORDER — METRONIDAZOLE 7.5 MG/G
1 GEL VAGINAL
Qty: 25 G | Refills: 0 | Status: SHIPPED | OUTPATIENT
Start: 2021-04-16 | End: 2021-04-21

## 2021-04-16 NOTE — PROGRESS NOTES
Chief Complaint   Vaginitis      HPI  39 y.o. female complains of creamy vaginal discharge for 3 weeks. .No LMP recorded. (Menstrual status: IUD). She has additional symptoms at this time. Odor, and pelvic pain. The patient  denies aggravating factors  She has exposure to new chemicals ot hygenic agents  Previous treatment included:  None. Past Medical History:   Diagnosis Date    Bulging disc     Family history of skin cancer     Fetal macrosomia in pregnancy     IUD (intrauterine device) in place 2019    Mirena placed 19    Scarring     Sun-damaged skin      Past Surgical History:   Procedure Laterality Date    HX  SECTION      2    IMPLANT BREAST SILICONE/EQ      9880     Social History     Occupational History    Not on file   Tobacco Use    Smoking status: Never Smoker    Smokeless tobacco: Never Used   Substance and Sexual Activity    Alcohol use: Yes     Alcohol/week: 1.0 - 2.0 standard drinks     Types: 1 - 2 Glasses of wine per week    Drug use: No    Sexual activity: Yes     Partners: Male     Birth control/protection: None     Comment: partner w/ vasectomy     Family History   Problem Relation Age of Onset    Colon Cancer Mother     Cancer Mother 72        colon    Cancer Father         lung and liver    Alcohol abuse Father     Breast Cancer Maternal Aunt     Diabetes Maternal Aunt     Hypertension Maternal Aunt         Allergies   Allergen Reactions    Nuvaring [Etonogestrel-Ethinyl Estradiol] Hives    Adhesive Rash     Prior to Admission medications    Medication Sig Start Date End Date Taking? Authorizing Provider   levonorgestreL (MIRENA) 20 mcg/24 hours (6 yrs) 52 mg IUD 1 Device by IntraUTERine route once. Provider, Historical   cefdinir (OMNICEF) 300 mg capsule Take 1 Cap by mouth two (2) times a day. 20   Good Kam NP   nystatin-triamcinolone (MYCOLOG II) topical cream Apply  to affected area two (2) times a day.  20 Magdaline Nails, NP   mupirocin (BACTROBAN) 2 % ointment Apply  to affected area two (2) times a day. 12/22/20   Magdaline Nails, NP   sertraline (ZOLOFT) 50 mg tablet Take 1 Tab by mouth daily.  12/22/20   Magdaline Nails, NP   fluticasone propionate (FLONASE) 50 mcg/actuation nasal spray USE ONE SPRAY IN EACH NOSTRIL TWICE A DAY AS NEEDED 12/22/20   Magdaline Nails, NP                      Review of Systems - History obtained from the patient  Constitutional: negative for weight loss, fever, night sweats  Breast: negative for breast lumps, nipple discharge, galactorrhea  GI: negative for change in bowel habits, abdominal pain, black or bloody stools  : negative for frequency, dysuria, hematuria  MSK: negative for back pain, joint pain, muscle pain  Skin: negative for itching, rash, hives  Neuro: negative for dizziness, headache, confusion, weakness  Psych: negative for anxiety, depression, change in mood  Heme/lymph: negative for bleeding, bruising, pallor       Objective:    Visit Vitals  /75   Wt 126 lb 3.2 oz (57.2 kg)   BMI 23.08 kg/m²       Physical Exam:   PHYSICAL EXAMINATION    Constitutional  · Appearance: well-nourished, well developed, alert, in no acute distress    HENT  · Head and Face: appears normal    Genitourinary  · External Genitalia: normal appearance for age, + discharge present, no tenderness present, no inflammatory lesions present, no masses present, no atrophy present  · Vagina:  + discharge present, otherwise normal vaginal vault without central or paravaginal defects, no inflammatory lesions present, no masses present  · Bladder: non-tender to palpation  · Urethra: appears normal  · Cervix: normal   · Uterus: normal size, shape and consistency  · Adnexa: no adnexal tenderness present, no adnexal masses present  · Perineum: perineum within normal limits, no evidence of trauma, no rashes or skin lesions present  · Anus: anus within normal limits, no hemorrhoids present  · Inguinal Lymph Nodes: no lymphadenopathy present    Skin  · General Inspection: no rash, no lesions identified    Neurologic/Psychiatric  · Mental Status:  · Orientation: grossly oriented to person, place and time  · Mood and Affect: mood normal, affect appropriate    Assessment:   Vaginitis- likely bv, will treat with metrogel and f/u with nuswab    Plan:   ROV prn if symptoms persist or worsen.

## 2021-04-19 LAB
A VAGINAE DNA VAG QL NAA+PROBE: ABNORMAL SCORE
BVAB2 DNA VAG QL NAA+PROBE: ABNORMAL SCORE
C ALBICANS DNA VAG QL NAA+PROBE: NEGATIVE
C GLABRATA DNA VAG QL NAA+PROBE: NEGATIVE
C TRACH DNA VAG QL NAA+PROBE: NEGATIVE
MEGA1 DNA VAG QL NAA+PROBE: ABNORMAL SCORE
N GONORRHOEA DNA VAG QL NAA+PROBE: NEGATIVE
SPECIMEN STATUS REPORT, ROLRST: NORMAL
T VAGINALIS DNA VAG QL NAA+PROBE: NEGATIVE

## 2021-07-08 ENCOUNTER — OFFICE VISIT (OUTPATIENT)
Dept: OBGYN CLINIC | Age: 46
End: 2021-07-08
Payer: COMMERCIAL

## 2021-07-08 ENCOUNTER — TELEPHONE (OUTPATIENT)
Dept: OBGYN CLINIC | Age: 46
End: 2021-07-08

## 2021-07-08 DIAGNOSIS — N76.0 VAGINITIS AND VULVOVAGINITIS: Primary | ICD-10-CM

## 2021-07-08 DIAGNOSIS — Z01.419 ENCOUNTER FOR GYNECOLOGICAL EXAMINATION WITH PAPANICOLAOU SMEAR OF CERVIX: ICD-10-CM

## 2021-07-08 PROCEDURE — 99214 OFFICE O/P EST MOD 30 MIN: CPT | Performed by: OBSTETRICS & GYNECOLOGY

## 2021-07-08 RX ORDER — FLUCONAZOLE 200 MG/1
200 TABLET ORAL
Qty: 3 TABLET | Refills: 1 | Status: SHIPPED | OUTPATIENT
Start: 2021-07-08 | End: 2021-07-13

## 2021-07-08 NOTE — TELEPHONE ENCOUNTER
Pt LM on VM requesting either an appointment or an Rx for Metrogel for her BV symptoms. Last AE: >1 yrs ago (12/2019 and has not been scheduled)  Last Pap: >5 yrs ago (1/2016)    [id number] [address] Deleon for appt to see you or whoever is on today, or for Rx?

## 2021-07-08 NOTE — PROGRESS NOTES
Vaginitis evaluation    Chief Complaint   Vaginitis      HPI  39 y.o. female complains of white and thick vaginal discharge for 4 days with an odor. No LMP recorded. (Menstrual status: IUD). She denies additional symptoms at this time other than irritation. The patient denies aggravating factors. She is not concerned about possible STI exposure at this time. She has had exposure to new chemicals or hygenic agents: used Gain for the first time over vacation  Previous treatment included: left over Metrogel x2 days  Also wants to do pap because getting ready to lose her insurance    Past Medical History:   Diagnosis Date    Bulging disc     Family history of skin cancer     Fetal macrosomia in pregnancy     IUD (intrauterine device) in place 2019    Mirena placed 19    Scarring     Sun-damaged skin      Past Surgical History:   Procedure Laterality Date    HX  SECTION      2    IMPLANT BREAST SILICONE/EQ      6239     Social History     Occupational History    Not on file   Tobacco Use    Smoking status: Never Smoker    Smokeless tobacco: Never Used   Substance and Sexual Activity    Alcohol use: Yes     Alcohol/week: 1.0 - 2.0 standard drinks     Types: 1 - 2 Glasses of wine per week    Drug use: No    Sexual activity: Yes     Partners: Male     Birth control/protection: I.U.D. Family History   Problem Relation Age of Onset    Colon Cancer Mother     Cancer Mother 72        colon    Cancer Father         lung and liver    Alcohol abuse Father     Breast Cancer Maternal Aunt     Diabetes Maternal Aunt     Hypertension Maternal Aunt         Allergies   Allergen Reactions    Nuvaring [Etonogestrel-Ethinyl Estradiol] Hives    Adhesive Rash     Prior to Admission medications    Medication Sig Start Date End Date Taking? Authorizing Provider   levonorgestreL (MIRENA) 20 mcg/24 hours (6 yrs) 52 mg IUD 1 Device by IntraUTERine route once.     Provider, Historical cefdinir (OMNICEF) 300 mg capsule Take 1 Cap by mouth two (2) times a day. 12/22/20   Brown Bennett NP   nystatin-triamcinolone (MYCOLOG II) topical cream Apply  to affected area two (2) times a day. 12/22/20   Brown Bennett NP   mupirocin (BACTROBAN) 2 % ointment Apply  to affected area two (2) times a day. 12/22/20   Brown Bennett NP   sertraline (ZOLOFT) 50 mg tablet Take 1 Tab by mouth daily. 12/22/20   Brown Bennett NP   fluticasone propionate (FLONASE) 50 mcg/actuation nasal spray USE ONE SPRAY IN EACH NOSTRIL TWICE A DAY AS NEEDED 12/22/20   Brown Bennett NP                      Review of Systems - History obtained from the patient  Constitutional: negative for weight loss, fever, night sweats  Breast: negative for breast lumps, nipple discharge, galactorrhea  GI: negative for change in bowel habits, abdominal pain, black or bloody stools  : negative for frequency, dysuria, hematuria  MSK: negative for back pain, joint pain, muscle pain  Skin: negative for itching, rash, hives  Neuro: negative for dizziness, headache, confusion, weakness  Psych: negative for anxiety, depression, change in mood  Heme/lymph: negative for bleeding, bruising, pallor       Objective: There were no vitals taken for this visit.     Physical Exam:   PHYSICAL EXAMINATION    Constitutional  · Appearance: well-nourished, well developed, alert, in no acute distress    HENT  · Head and Face: appears normal    Genitourinary  · External Genitalia: normal appearance for age, no discharge present, no tenderness present, no inflammatory lesions present, no masses present, no atrophy present  · Vagina:  Clumpy white discharge present, otherwise normal vaginal vault without central or paravaginal defects, no inflammatory lesions present, no masses present  · Bladder: non-tender to palpation  · Urethra: appears normal  · Cervix: normal   · Uterus: normal size, shape and consistency  · Adnexa: no adnexal tenderness present, no adnexal masses present  · Perineum: perineum within normal limits, no evidence of trauma, no rashes or skin lesions present  · Anus: anus within normal limits, no hemorrhoids present  · Inguinal Lymph Nodes: no lymphadenopathy present    Skin  · General Inspection: no rash, no lesions identified    Neurologic/Psychiatric  · Mental Status:  · Orientation: grossly oriented to person, place and time  · Mood and Affect: mood normal, affect appropriate      No results found for any visits on 07/08/21. Assessment:   Monilia vaginitis, rule out BV. Pap done per pt request.    Plan:   Treatment: Diflucan, call if BV also. Pap done    ROV prn if symptoms persist or worsen.

## 2021-07-08 NOTE — TELEPHONE ENCOUNTER
Patient really wants appt for today with or with Dr. Berta Marquez      Work in [facility]- scheduled for this pm.

## 2021-07-08 NOTE — PATIENT INSTRUCTIONS
Vaginitis: Care Instructions  Your Care Instructions     Vaginitis is soreness or infection of the vagina. This common problem can cause itching and burning. And it can cause a change in vaginal discharge. Sometimes it can cause pain during sex. Vaginitis may be caused by bacteria, yeast, or other germs. Some infections that cause it are caught from a sexual partner. Bath products, spermicides, and douches can irritate the vagina too. Some women have this problem during and after menopause. A drop in estrogen levels during this time can cause dryness, soreness, and pain during sex. Your doctor can give you medicine to treat an infection. And home care may help you feel better. For certain types of infections, your sex partner must be treated too. Follow-up care is a key part of your treatment and safety. Be sure to make and go to all appointments, and call your doctor if you are having problems. It's also a good idea to know your test results and keep a list of the medicines you take. How can you care for yourself at home? · If your doctor prescribed antibiotics, take them as directed. Do not stop taking them just because you feel better. You need to take the full course of antibiotics. · Take your medicines exactly as prescribed. Call your doctor if you think you are having a problem with your medicine. · Do not eat or drink anything that has alcohol if you are taking metronidazole (Flagyl). · If you have a yeast infection, use over-the-counter products as your doctor tells you to. Or take medicine your doctor prescribes exactly as directed. · Wash your vaginal area daily with water. You also can use a mild, unscented soap if you want. · Do not use scented bath products. And do not use vaginal sprays or douches. · Put a washcloth soaked in cool water on the area to relieve itching. Or you can take cool baths.   · If you have dryness because of menopause, use estrogen cream or pills that your doctor prescribes. · Ask your doctor about when it is okay to have sex. · Use a personal lubricant before sex if you have dryness. Examples are Astroglide, K-Y Jelly, and Wet Lubricant Gel. · Ask your doctor if your sex partner also needs treatment. When should you call for help? Call your doctor now or seek immediate medical care if:    · You have a fever and pelvic pain. Watch closely for changes in your health, and be sure to contact your doctor if:    · You have bleeding other than your period.     · You do not get better as expected. Where can you learn more? Go to http://www.gray.com/  Enter [id number] in the search box to learn more about \"Vaginitis: Care Instructions. \"  Current as of: July 17, 2020               Content Version: 12.8  © 5430-3558 [employer]wise[employer]. Care instructions adapted under license by [employer] (which disclaims liability or warranty for this information). If you have questions about a medical condition or this instruction, always ask your healthcare professional. [address] any warranty or liability for your use of this information.

## 2021-07-10 LAB
A VAGINAE DNA VAG QL NAA+PROBE: ABNORMAL SCORE
BVAB2 DNA VAG QL NAA+PROBE: ABNORMAL SCORE
C ALBICANS DNA VAG QL NAA+PROBE: NEGATIVE
C GLABRATA DNA VAG QL NAA+PROBE: NEGATIVE
MEGA1 DNA VAG QL NAA+PROBE: ABNORMAL SCORE

## 2021-07-12 ENCOUNTER — TELEPHONE (OUTPATIENT)
Dept: OBGYN CLINIC | Age: 46
End: 2021-07-12

## 2021-07-12 RX ORDER — METRONIDAZOLE 7.5 MG/G
1 GEL VAGINAL
Qty: 25 G | Refills: 0 | Status: SHIPPED | OUTPATIENT
Start: 2021-07-12 | End: 2021-07-17

## 2021-07-12 NOTE — TELEPHONE ENCOUNTER
Patient walked into the office this morning stating that she is still having symptoms. She was last seen on 7/8/21-she states the Diflucan did not work and based on her Nuswab she does have some bacteria so she would like to be treated for BV-she last used Metrogel    Please advise and review pended RX.

## 2021-07-13 LAB
CYTOLOGIST CVX/VAG CYTO: NORMAL
CYTOLOGY CVX/VAG DOC CYTO: NORMAL
CYTOLOGY CVX/VAG DOC THIN PREP: NORMAL
CYTOLOGY HISTORY:: NORMAL
DX ICD CODE: NORMAL
HPV I/H RISK 4 DNA CVX QL PROBE+SIG AMP: NEGATIVE
Lab: NORMAL
Lab: NORMAL
OTHER STN SPEC: NORMAL
STAT OF ADQ CVX/VAG CYTO-IMP: NORMAL

## 2022-05-24 NOTE — PROGRESS NOTES
Buck Vizcarra is a ,  42 y.o. female WHITE/NON-  who presents for her annual checkup. She is having vaginal irritation. She wants STD testing today. Has itching also - recently had abx    Menstrual status:    Her periods are spotting. She is using one to two pads or tampons per day, usually regular and last 26-30 days. She denies dysmenorrhea. She reports no premenstrual symptoms. The patient is not using HRT. Contraception:    The current method of family planning is IUD. Sexual history:    She  reports being sexually active and has had partner(s) who are male. She reports using the following method of birth control/protection: I.U.D..    Medical conditions:    Since her last annual GYN exam about one year ago, she has had the following changes in her health history: none. Pap and Mammogram History:    Her most recent Pap smear 21 was normal, HPV neg. obtained 1 year(s) ago. The patient had a recent mammogram 20 which was negative for malignancy. Breast Cancer History/Substance Abuse:    She has no and a family history of breast cancer. Osteoporosis History:    Family history does not include a first or second degree relative with osteopenia or osteoporosis. A bone density scan was not obtained. She is currently not taking calcium and vit D. Past Medical History:   Diagnosis Date    Bulging disc     Family history of skin cancer     Fetal macrosomia in pregnancy     IUD (intrauterine device) in place 2019    Mirena placed 19    Scarring     Sun-damaged skin      Past Surgical History:   Procedure Laterality Date    HX  SECTION      2    IMPLANT BREAST SILICONE/EQ      9425     Current Outpatient Medications   Medication Sig Dispense Refill    levonorgestreL (MIRENA) 20 mcg/24 hours (6 yrs) 52 mg IUD 1 Device by IntraUTERine route once.       fluticasone propionate (FLONASE) 50 mcg/actuation nasal spray USE ONE SPRAY IN EACH NOSTRIL TWICE A DAY AS NEEDED 48 g 4     Allergies: Nuvaring [etonogestrel-ethinyl estradiol] and Adhesive   Social History     Socioeconomic History    Marital status:      Spouse name: Not on file    Number of children: Not on file    Years of education: Not on file    Highest education level: Not on file   Occupational History    Not on file   Tobacco Use    Smoking status: Never Smoker    Smokeless tobacco: Never Used   Substance and Sexual Activity    Alcohol use: Yes     Alcohol/week: 1.0 - 2.0 standard drink     Types: 1 - 2 Glasses of wine per week    Drug use: No    Sexual activity: Yes     Partners: Male     Birth control/protection: I.U.D. Other Topics Concern    Not on file   Social History Narrative    Not on file     Social Determinants of Health     Financial Resource Strain:     Difficulty of Paying Living Expenses: Not on file   Food Insecurity:     Worried About Running Out of Food in the Last Year: Not on file    Portia of Food in the Last Year: Not on file   Transportation Needs:     Lack of Transportation (Medical): Not on file    Lack of Transportation (Non-Medical):  Not on file   Physical Activity:     Days of Exercise per Week: Not on file    Minutes of Exercise per Session: Not on file   Stress:     Feeling of Stress : Not on file   Social Connections:     Frequency of Communication with Friends and Family: Not on file    Frequency of Social Gatherings with Friends and Family: Not on file    Attends Episcopal Services: Not on file    Active Member of Clubs or Organizations: Not on file    Attends Club or Organization Meetings: Not on file    Marital Status: Not on file   Intimate Partner Violence:     Fear of Current or Ex-Partner: Not on file    Emotionally Abused: Not on file    Physically Abused: Not on file    Sexually Abused: Not on file   Housing Stability:     Unable to Pay for Housing in the Last Year: Not on file    Number of Places Lived in the Last Year: Not on file    Unstable Housing in the Last Year: Not on file     Tobacco History:  reports that she has never smoked. She has never used smokeless tobacco.  Alcohol Abuse:  reports current alcohol use of about 1.0 - 2.0 standard drink of alcohol per week. Drug Abuse:  reports no history of drug use.   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    Bunion, left foot M21.612    Chronic back pain M54.9, G89.29         Review of Systems - History obtained from the patient  Constitutional: negative for weight loss, fever, night sweats  HEENT: negative for hearing loss, earache, congestion, snoring, sorethroat  CV: negative for chest pain, palpitations, edema  Resp: negative for cough, shortness of breath, wheezing  GI: negative for change in bowel habits, abdominal pain, black or bloody stools  : negative for frequency, dysuria, hematuria, vaginal discharge  MSK: negative for back pain, joint pain, muscle pain  Breast: negative for breast lumps, nipple discharge, galactorrhea  Skin :negative for itching, rash, hives  Neuro: negative for dizziness, headache, confusion, weakness  Psych: negative for anxiety, depression, change in mood  Heme/lymph: negative for bleeding, bruising, pallor    Physical Exam    Visit Vitals  /85   Wt 125 lb (56.7 kg)   BMI 22.86 kg/m²     Constitutional  · Appearance: well-nourished, well developed, alert, in no acute distress    HENT  · Head and Face: appears normal    Neck  · Inspection/Palpation: normal appearance, no masses or tenderness  · Lymph Nodes: no lymphadenopathy present  · Thyroid: gland size normal, nontender, no nodules or masses present on palpation    Chest  · Respiratory Effort: breathing normal  · Auscultation: normal breath sounds    Cardiovascular  · Heart:  · Auscultation: regular rate and rhythm without murmur    Breasts  · Inspection of Breasts: breasts symmetrical, no skin changes, no discharge present, nipple appearance normal, no skin retraction present  · Palpation of Breasts and Axillae: no masses present on palpation, no breast tenderness  · Axillary Lymph Nodes: no lymphadenopathy present    Gastrointestinal  · Abdominal Examination: abdomen non-tender to palpation, normal bowel sounds, no masses present  · Liver and spleen: no hepatomegaly present, spleen not palpable  · Hernias: no hernias identified    Skin  · General Inspection: no rash, no lesions identified    Neurologic/Psychiatric  · Mental Status:  · Orientation: grossly oriented to person, place and time  · Mood and Affect: mood normal, affect appropriate    Genitourinary  · External Genitalia: normal appearance for age, no discharge present, no tenderness present, no inflammatory lesions present, no masses present, no atrophy present  · Vagina: normal vaginal vault without central or paravaginal defects, no discharge present, no inflammatory lesions present, no masses present  · Bladder: non-tender to palpation  · Urethra: appears normal  · Cervix: normal,string seen   · Uterus: normal size, shape and consistency  · Adnexa: no adnexal tenderness present, no adnexal masses present  · Perineum: perineum within normal limits, no evidence of trauma, no rashes or skin lesions present  · Anus: anus within normal limits, no hemorrhoids present  · Inguinal Lymph Nodes: no lymphadenopathy present    Assessment:  Routine gynecologic examination  Her current medical status is satisfactory with no evidence of significant gynecologic issues.   STD testing  Plan:  Counseled re: diet, exercise, healthy lifestyle  Return for yearly wellness visits  Rec annual mammogram  Nuswab [address] [facility]  Blood STD testing

## 2022-05-26 ENCOUNTER — OFFICE VISIT (OUTPATIENT)
Dept: OBGYN CLINIC | Age: 47
End: 2022-05-26
Payer: COMMERCIAL

## 2022-05-26 VITALS — DIASTOLIC BLOOD PRESSURE: 85 MMHG | BODY MASS INDEX: 22.86 KG/M2 | SYSTOLIC BLOOD PRESSURE: 123 MMHG | WEIGHT: 125 LBS

## 2022-05-26 DIAGNOSIS — N89.8 VAGINAL IRRITATION: ICD-10-CM

## 2022-05-26 DIAGNOSIS — Z01.419 ENCOUNTER FOR GYNECOLOGICAL EXAMINATION (GENERAL) (ROUTINE) WITHOUT ABNORMAL FINDINGS: Primary | ICD-10-CM

## 2022-05-26 DIAGNOSIS — Z11.3 SCREENING FOR STD (SEXUALLY TRANSMITTED DISEASE): ICD-10-CM

## 2022-05-26 PROCEDURE — 99396 PREV VISIT EST AGE 40-64: CPT | Performed by: OBSTETRICS & GYNECOLOGY

## 2022-06-02 RX ORDER — METRONIDAZOLE 500 MG/1
500 TABLET ORAL 2 TIMES DAILY
Qty: 14 TABLET | Refills: 0 | Status: SHIPPED | OUTPATIENT
Start: 2022-06-02 | End: 2022-06-09

## 2022-10-03 NOTE — PROGRESS NOTES
Bernarda Vizcarra (: 1975) is a 41 y.o. female, established patient, here for evaluation of the following chief complaint(s):  Rash (Patient says the rash keeps getting worse)       ASSESSMENT/PLAN:  Below is the assessment and plan developed based on review of pertinent history, physical exam, labs, studies, and medications. 1. Rash-patient has a diffuse body rash which definitely looks like a dermatitis but I am order the etiology of this rash. It seems as its been going on for the last month and potentially the steroid cream she was putting on the original spot could have exacerbated things? She has no new foods no new detergents or soaps. She has been on steroid Dosepaks 2 times in the last month. I told her to start taking Zyrtec twice a day and Pepcid twice a day as well as she can add a Benadryl at night. She needs to make an appointment with her dermatologist as well as seen allergist and I gave her the name of [facility] on [facility]  2. Anxiety-given her degree of anxiety and the fact that it appears she was on Zoloft the longest for [facility] put her back on a low-dose of this and she will follow-up with me for lab work and a physical in a couple months  -     sertraline (ZOLOFT) 50 mg tablet; Take 1 Tablet by mouth daily. , Normal, Disp-30 Tablet, R-3    No follow-ups on file. SUBJECTIVE/OBJECTIVE:  HPI    Patient has a rash all over her body that has been going on since august- took prednisone and used ointment but now it is worse and itching and red and over her body- so last Wednesday and did tele doctor appt - gave steroid again and keflex     Seeing a counselor once a week; her anxiety and depression is back and wondering what she should do about it as her counselor does recommend her being back on medicines.   She wanted know what she had been on the past and we had written for Lexapro, Zoloft, and Effexor in the past but not sure what her response had been to any of those medicines      Review of Systems   All other systems reviewed and are negative. Physical Exam  Vitals and nursing note reviewed. Constitutional:       Appearance: She is well-developed. HENT:      Head: Normocephalic and atraumatic. Right Ear: External ear normal.      Left Ear: External ear normal.      Nose: Nose normal.   Neck:      Thyroid: No thyroid mass or thyromegaly. Vascular: No carotid bruit or JVD. Cardiovascular:      Rate and Rhythm: Normal rate and regular rhythm. Pulses: Normal pulses. Heart sounds: Normal heart sounds, S1 normal and S2 normal. No murmur heard. No friction rub. No gallop. Pulmonary:      Effort: Pulmonary effort is normal.      Breath sounds: Normal breath sounds. Abdominal:      General: Bowel sounds are normal.      Palpations: Abdomen is soft. Musculoskeletal:         General: Normal range of motion. Cervical back: Normal range of motion and neck supple. Skin:     General: Skin is warm and dry. Findings: Rash present. Neurological:      Mental Status: She is alert and oriented to person, place, and time. Psychiatric:         Behavior: Behavior normal.         Thought Content: Thought content normal.         Judgment: Judgment normal.       On this date 10/04/2022 I have spent 30 minutes reviewing previous notes, test results and face to face with the patient discussing the diagnosis and importance of compliance with the treatment plan as well as documenting on the day of the visit. An electronic signature was used to authenticate this note.   -- Kelsey Dorman MD

## 2022-10-04 ENCOUNTER — OFFICE VISIT (OUTPATIENT)
Dept: INTERNAL MEDICINE CLINIC | Age: 47
End: 2022-10-04
Payer: COMMERCIAL

## 2022-10-04 VITALS
RESPIRATION RATE: 16 BRPM | BODY MASS INDEX: 23.19 KG/M2 | DIASTOLIC BLOOD PRESSURE: 85 MMHG | HEART RATE: 73 BPM | SYSTOLIC BLOOD PRESSURE: 118 MMHG | WEIGHT: 126 LBS | TEMPERATURE: 98.4 F | HEIGHT: 62 IN | OXYGEN SATURATION: 98 %

## 2022-10-04 DIAGNOSIS — F41.9 ANXIETY: ICD-10-CM

## 2022-10-04 DIAGNOSIS — R21 RASH: Primary | ICD-10-CM

## 2022-10-04 PROCEDURE — 99214 OFFICE O/P EST MOD 30 MIN: CPT | Performed by: INTERNAL MEDICINE

## 2022-10-04 RX ORDER — SERTRALINE HYDROCHLORIDE 50 MG/1
50 TABLET, FILM COATED ORAL DAILY
Qty: 30 TABLET | Refills: 3 | Status: SHIPPED | OUTPATIENT
Start: 2022-10-04

## 2022-10-06 ENCOUNTER — TELEPHONE (OUTPATIENT)
Dept: INTERNAL MEDICINE CLINIC | Age: 47
End: 2022-10-06

## 2022-10-06 RX ORDER — FLUCONAZOLE 150 MG/1
TABLET ORAL
Qty: 2 TABLET | Refills: 0 | Status: SHIPPED | OUTPATIENT
Start: 2022-10-06

## 2022-10-06 NOTE — TELEPHONE ENCOUNTER
Pt is calling wanted to know if the doctor could call her in a yeast infection medication. Pt states she thinks she has a yeast infection.  Please advise

## 2023-02-16 ENCOUNTER — TELEPHONE (OUTPATIENT)
Dept: OBGYN CLINIC | Age: 48
End: 2023-02-16

## 2023-02-16 NOTE — PROGRESS NOTES
Shaka Barboza Case is a 41 y.o. female presents for a problem visit. Chief Complaint   Patient presents with    Vaginal Discharge     No LMP recorded. (Menstrual status: IUD). Birth Control: IUD. Last Pap: 07/08/2021 normal/HPV neg    The patient is reporting having:vaginal discharge since 02/15/2023 White in color. Had a new partner recently and states she did not use protection  She reports the symptoms are is unchanged. Aggravating factors include none. And alleviating factors include none. 1. Have you been to the ER, urgent care clinic, or hospitalized since your last visit? No    2. Have you seen or consulted any other health care providers outside of the [address] [place] [address] since your last visit?  No    Examination chaperoned by Cyndie Franks RN

## 2023-02-16 NOTE — TELEPHONE ENCOUNTER
Patient advised of MD recommendations and will keep her appointment for tomorrow that has already been made     Patient verbalized understanding.

## 2023-02-17 ENCOUNTER — OFFICE VISIT (OUTPATIENT)
Dept: OBGYN CLINIC | Age: 48
End: 2023-02-17
Payer: COMMERCIAL

## 2023-02-17 VITALS — SYSTOLIC BLOOD PRESSURE: 106 MMHG | WEIGHT: 125.8 LBS | DIASTOLIC BLOOD PRESSURE: 70 MMHG | BODY MASS INDEX: 23.01 KG/M2

## 2023-02-17 DIAGNOSIS — N89.8 VAGINAL DISCHARGE: Primary | ICD-10-CM

## 2023-02-17 PROCEDURE — 99213 OFFICE O/P EST LOW 20 MIN: CPT | Performed by: OBSTETRICS & GYNECOLOGY

## 2023-02-17 NOTE — PROGRESS NOTES
OB/GYN Problem VIsit    HPI  Antoni Hamlin Case is a ,  41 y.o. female who presents for a problem visit. Had unprotected sex this past weekend. Now has white discharge, some odor and irritation. No LMP recorded. (Menstrual status: IUD). Birth Control: IUD. Last Pap: 2021 normal/HPV neg   She reports the symptoms are is unchanged. Aggravating factors include none. And alleviating factors include none. Past Medical History:   Diagnosis Date    Bulging disc     Family history of skin cancer     Fetal macrosomia in pregnancy     IUD (intrauterine device) in place 2019    Mirena placed 19    Scarring     Sun-damaged skin      Past Surgical History:   Procedure Laterality Date    HX  SECTION      2    IMPLANT BREAST SILICONE/EQ      8087     Social History     Occupational History    Not on file   Tobacco Use    Smoking status: Never    Smokeless tobacco: Never   Substance and Sexual Activity    Alcohol use: Yes     Alcohol/week: 1.0 - 2.0 standard drink     Types: 1 - 2 Glasses of wine per week    Drug use: No    Sexual activity: Yes     Partners: Male     Birth control/protection: I.U.D. Family History   Problem Relation Age of Onset    Colon Cancer Mother     Cancer Mother 72        colon    Cancer Father         lung and liver    Alcohol abuse Father     Breast Cancer Maternal Aunt     Diabetes Maternal Aunt     Hypertension Maternal Aunt        Allergies   Allergen Reactions    Nuvaring [Etonogestrel-Ethinyl Estradiol] Hives    Adhesive Rash     Prior to Admission medications    Medication Sig Start Date End Date Taking? Authorizing Provider   levonorgestreL (MIRENA) 20 mcg/24 hours (6 yrs) 52 mg IUD 1 Device by IntraUTERine route once.    Yes Provider, Historical   fluticasone propionate (FLONASE) 50 mcg/actuation nasal spray USE ONE SPRAY IN EACH NOSTRIL TWICE A DAY AS NEEDED 20  Yes Matthew Disla NP        Review of Systems: History obtained from the patient  Constitutional: negative for weight loss, fever, night sweats  Breast: negative for breast lumps, nipple discharge, galactorrhea  GI: negative for change in bowel habits, abdominal pain, black or bloody stools  : negative for frequency, dysuria, hematuria, vaginal discharge  MSK: negative for back pain, joint pain, muscle pain  Skin: negative for itching, rash, hives  Psych: negative for anxiety, depression, change in mood      Objective:  Visit Vitals  /70   Wt 125 lb 12.8 oz (57.1 kg)   BMI 23.01 kg/m²       Physical Exam:   PHYSICAL EXAMINATION    Constitutional  Appearance: well-nourished, well developed, alert, in no acute distress      Gastrointestinal  Abdominal Examination: abdomen non-tender to palpation, normal bowel sounds, no masses present  Liver and spleen: no hepatomegaly present, spleen not palpable  Hernias: no hernias identified    Genitourinary  External Genitalia: normal appearance for age, no discharge present, no tenderness present, no inflammatory lesions present, no masses present, no atrophy present  Vagina: normal vaginal vault without central or paravaginal defects, white discharge present, no inflammatory lesions present, no masses present  Bladder: non-tender to palpation  Urethra: appears normal  Cervix: normal   Uterus: normal size, shape and consistency  Adnexa: no adnexal tenderness present, no adnexal masses present  Perineum: perineum within normal limits, no evidence of trauma, no rashes or skin lesions present  Anus: anus within normal limits, no hemorrhoids present  Inguinal Lymph Nodes: no lymphadenopathy present    Skin  General Inspection: no rash, no lesions identified    Neurologic/Psychiatric  Mental Status:  Orientation: grossly oriented to person, place and time  Mood and Affect: mood normal, affect appropriate      ASSESSMENT:    ICD-10-CM ICD-9-CM    1.  Vaginal discharge  N89.8 623.5           PLAN:  No orders of the defined types were placed in this encounter. RTO prn if symptoms persist or worsen. Instructions given to pt. Handouts given to pt.

## 2023-02-21 LAB
A VAGINAE DNA VAG QL NAA+PROBE: ABNORMAL SCORE
BVAB2 DNA VAG QL NAA+PROBE: ABNORMAL SCORE
C ALBICANS DNA VAG QL NAA+PROBE: POSITIVE
C GLABRATA DNA VAG QL NAA+PROBE: NEGATIVE
C TRACH DNA VAG QL NAA+PROBE: NEGATIVE
MEGA1 DNA VAG QL NAA+PROBE: ABNORMAL SCORE
N GONORRHOEA DNA VAG QL NAA+PROBE: NEGATIVE
T VAGINALIS DNA VAG QL NAA+PROBE: NEGATIVE

## 2023-02-21 RX ORDER — METRONIDAZOLE 500 MG/1
500 TABLET ORAL 2 TIMES DAILY
Qty: 14 TABLET | Refills: 0 | Status: SHIPPED | OUTPATIENT
Start: 2023-02-21 | End: 2023-02-28

## 2023-02-21 RX ORDER — FLUCONAZOLE 150 MG/1
150 TABLET ORAL DAILY
Qty: 1 TABLET | Refills: 1 | Status: SHIPPED | OUTPATIENT
Start: 2023-02-21

## 2023-02-22 ENCOUNTER — TELEPHONE (OUTPATIENT)
Dept: OBGYN CLINIC | Age: 48
End: 2023-02-22

## 2023-02-22 NOTE — TELEPHONE ENCOUNTER
41year old patient last seen in the office on the 2/17/2023 and is calling about her recent lab results    Patient advise of MD reviewed lab results and recommendations     Patient advised of prescriptions sent to her pharmacy and instructions provided    Patient verbalized understanding.     NUSWAB VAGINITIS PLUS: Patient Communication   Released  Not seen    BV and yeast - sent 2 prescriptions   Written by Edmond Leahy MD on 2/21/2023 12:53 PM EST

## 2023-03-03 NOTE — ED NOTES
Pt ambulatory to restroom. Plan of care and all test/meds ordered explained to pt. Good understanding and agreement with plan was verbalized. Call bell within reach. Social Work Intervention   [facility]    Data/Intervention:    Patient Name:  Carl Vázquez  /Age:  1938 (84 year old)    Visit Type: in person  Referral Source: Dr Baez  Reason for Referral:  Assessment of needs    Collaborated With:    -Carl and his wife Michael  -Dr Baez and members of the ALS interdisciplinary team    Psychosocial Information/Concerns:  Pt does not have an ALS or MND dx at this time. Still in work up which has been long for them in terms of getting an answer to why he has increasing weakness. See also PT notes. Pt and wife live in their own home in [place]. Michael is his primary caregiver. They have 2 dtrs, one in [place] and one in [place] and 5 grandchildren that they see monthly for games. They have a cabin in [place] area that they go to on the weekends in the summer and they are hoping to get there this summer. It needs a ramp. They are getting a ramp installed at their home next week.   Pt engaged when I inquired about what work he did in his life and he described different business ventures- gas station, construction, [employer] and others.   They don't have any insurance or financial concerns now.   They brought in a copy of his health care directive which I copied and sent to [facility] to get into his record.     Intervention/Education/Resources Provided:  Reviewed some of the services of [employer] if he does get an ALS diagnosis. (filemon respite, ramp program, $1000 stipend). Discussed [employer] and will complete and mail an application to them. Right now, she is able to get him in/out of their van.  Reviewed whether they met criteria for Critical access hospital funded programs and they do not (cabin)   Both were tired and didn't have any questions or concerns to address with me today.     Assessment/Plan:  Neither really wanted to discuss how they are feeling about what they are going thru. Both pretty stoic today. Michael looked stressed. Encouraged them to  contact me as needed.   Will mail [employer] application to them to complete and mail in.     Provided patient/family with contact information and availability.    ELIER Alves, [facility]    [facility] [place]  [phone]

## 2023-06-09 ENCOUNTER — TELEPHONE (OUTPATIENT)
Age: 48
End: 2023-06-09

## 2023-06-09 NOTE — TELEPHONE ENCOUNTER
Two patient identifiers used    41year old patient last seen in the office on 5/26/2022 for ae      Patient has had vaginal irritation  starting last Thursday and took diflucan 150  on Sunday day and has used a 3 day course of monistat and is still having irritation , burning , discharge and  slight odor    Patient reports slight burning with urination    Patient was placed on the schedule to be seen on 6/12/2023 at 12:50PM    Patient verbalized understanding.

## 2023-07-28 NOTE — PROGRESS NOTES
Gracy Cooper is a 41 y.o. female returns for an annual exam     Chief Complaint   Patient presents with    Annual Exam       No LMP recorded. (Menstrual status: IUD). Her periods are absent due to IUD  She does not have dysmenorrhea. Problems: no problems  Birth Control: IUD placed 5/13/2019  Last Pap: 7/8/2021 normal/HPV neg  She does not have a history of VIRGIE 2, 3 or cervical cancer. Last Mammogram: had a recent mammogram 6/26/2023 which was negative for malignancy.     Last colonoscopy: never        Examination chaperoned by Sara Almanza MA.

## 2023-08-01 ENCOUNTER — OFFICE VISIT (OUTPATIENT)
Age: 48
End: 2023-08-01

## 2023-08-01 VITALS — WEIGHT: 130.6 LBS | DIASTOLIC BLOOD PRESSURE: 84 MMHG | SYSTOLIC BLOOD PRESSURE: 127 MMHG | BODY MASS INDEX: 23.89 KG/M2

## 2023-08-01 DIAGNOSIS — Z01.419 ENCOUNTER FOR GYNECOLOGICAL EXAMINATION (GENERAL) (ROUTINE) WITHOUT ABNORMAL FINDINGS: Primary | ICD-10-CM

## 2023-08-01 NOTE — PROGRESS NOTES
Felipe Jeffrey Case is a ,  41 y.o. female White (non-) whose LMP was on  who presents for her annual checkup. She is having no problems. Menstrual status:    Her periods are absent in flow IUD    She denies dysmenorrhea. Contraception:    The current method of family planning is IUD. 2019    Sexual history:    She  reports being sexually active and has had partner(s) who are male. She reports using the following method of birth control/protection: I.U.D..        Pap and Mammogram History:  Last Pap: 2021 normal/HPV neg  She does not have a history of VIRGIE 2, 3 or cervical cancer. Last Mammogram: had a recent mammogram 2023 which was negative for malignancy. Last colonoscopy: never         Breast Cancer History    She has a family history of breast cancer.     Family History   Problem Relation Age of Onset    Hypertension Maternal Aunt     Alcohol Abuse Father     Colon Cancer Mother     Cancer Mother 72        colon    Diabetes Maternal Aunt     Breast Cancer Maternal Aunt     Cancer Father         lung and liver       Past Medical History:   Diagnosis Date    Bulging disc     Family history of skin cancer     Fetal macrosomia in pregnancy     IUD (intrauterine device) in place 2019    Mirena placed 19    Scarring     Sun-damaged skin      Past Surgical History:   Procedure Laterality Date    BREAST ENHANCEMENT SURGERY       SECTION      2    IMPLANT BREAST SILICONE/EQ      1524     Current Outpatient Medications   Medication Sig Dispense Refill    fluticasone (FLONASE) 50 MCG/ACT nasal spray USE ONE SPRAY IN EACH NOSTRIL TWICE A DAY AS NEEDED      levonorgestrel (MIRENA) IUD 52 mg 1 Device by IntraUTERine route once      cephALEXin (KEFLEX) 500 MG capsule Take 1 capsule by mouth 4 times daily 28 capsule 0    fluconazole (DIFLUCAN) 150 MG tablet Take by mouth daily (Patient not taking: Reported on 2023)       No current facility-administered medications

## 2023-11-02 ENCOUNTER — OFFICE VISIT (OUTPATIENT)
Age: 48
End: 2023-11-02

## 2023-11-02 VITALS
WEIGHT: 132.6 LBS | DIASTOLIC BLOOD PRESSURE: 85 MMHG | OXYGEN SATURATION: 98 % | HEIGHT: 63 IN | BODY MASS INDEX: 23.5 KG/M2 | HEART RATE: 79 BPM | SYSTOLIC BLOOD PRESSURE: 128 MMHG | TEMPERATURE: 98.8 F

## 2023-11-02 DIAGNOSIS — M54.50 ACUTE BILATERAL LOW BACK PAIN WITHOUT SCIATICA: Primary | ICD-10-CM

## 2023-11-02 RX ORDER — METHOCARBAMOL 750 MG/1
750 TABLET, FILM COATED ORAL 4 TIMES DAILY
Qty: 40 TABLET | Refills: 0 | Status: SHIPPED | OUTPATIENT
Start: 2023-11-02 | End: 2023-11-12

## 2023-11-02 RX ORDER — LIDOCAINE 50 MG/G
1 PATCH TOPICAL DAILY
Qty: 10 PATCH | Refills: 0 | Status: SHIPPED | OUTPATIENT
Start: 2023-11-02 | End: 2023-11-02

## 2023-11-02 RX ORDER — LIDOCAINE 50 MG/G
1 PATCH TOPICAL DAILY
Qty: 10 PATCH | Refills: 0 | Status: SHIPPED | OUTPATIENT
Start: 2023-11-02 | End: 2023-11-12

## 2023-11-02 RX ORDER — METHOCARBAMOL 750 MG/1
750 TABLET, FILM COATED ORAL 4 TIMES DAILY
Qty: 40 TABLET | Refills: 0 | Status: SHIPPED | OUTPATIENT
Start: 2023-11-02 | End: 2023-11-02

## 2023-11-02 NOTE — PATIENT INSTRUCTIONS
Please follow up with your primary care provider within 2-3 days if  your signs and symptoms have not resolved or worsened. Please go immediately to the Emergency Department if you develop loss of bowel or bladder function, peripheral numbness/weakness/tingling, shortness of breath, chest pain, and significant fevers above 100.4F. If you develop a fever, gross inability to walk/weakness, severe numbness in bilateral lower extremities, saddle paresthesias, and/or loss of bladder/bowel control, please go to the nearest emergency department for concern of cauda equina. Patient Instructions:  -Robaxin should only be taken if needed, as it may cause drowsiness or dizziness. Avoid driving. Avoid alcohol/drugs that can also make you sleepy.  -Alternate between ice and heat, take warm epsom salt baths, do gentle stretches, and alternate Ibuprofen and Tylenol as allowed by your PCP. -Increase water hydration. Supportive therapies:  -Perform low back pain range-of-motion exercises as tolerated. -Resting on a firm mattress (lay on your back with knees raised on a pillow or lay on your side with knees bent at 90 degrees).   -Professional massages  -Relaxation techniques  -Heat application for 30 minutes every 4-5 hours  -Weight loss, if applicable  -Ergonomic therapies  -Refraining from lifting heavy objects

## 2023-11-02 NOTE — PROGRESS NOTES
Cardiovascular:      Rate and Rhythm: Normal rate. Pulses: Normal pulses. Pulmonary:      Effort: Pulmonary effort is normal.   Abdominal:      General: Abdomen is flat. Bowel sounds are normal.      Palpations: Abdomen is soft. Musculoskeletal:         General: Tenderness present. No swelling or deformity. Cervical back: Normal range of motion. Skin:     General: Skin is warm and dry. Neurological:      Mental Status: She is alert and oriented to person, place, and time. Assessment & Plan     Diagnoses and all orders for this visit:  Acute bilateral low back pain without sciatica  -     lidocaine (LIDODERM) 5 %; Place 1 patch onto the skin daily for 10 days 12 hours on, 12 hours off.  -     methocarbamol (ROBAXIN-750) 750 MG tablet; Take 1 tablet by mouth 4 times daily for 10 days      No orders to display   The patient presents with lower back pain without evidence for a more malignant injury. There is no suggestion of cauda equina and no neurovascular symptoms. No suggestion of malignancy, mass lesion, or aortic dissection. There has been no history of immunocompromise, recent spinal injection or IVDU to suggest spinal epidural abscess. The patient did not have urinary incontinence, bowel incontinence, saddle anesthesia, fever, or weight loss. Given the patient's presentation and physical exam findings, I did not feel that imaging was warranted. The patient has significant muscular tenderness to palpation in the lumbar paraspinous musculature. The patient's exam was not consistent with pyelonephritis and the patient is afebrile, making other infectious etiologies less likely. The patient is a good candidate for outpatient symptomatic management. I instructed the patient that back pain of this nature will take time to improve, but it often does.   Patient was instructed to go to the emergency department with any worsening symptoms including, but not limited to, numbness or

## 2023-11-06 ASSESSMENT — ENCOUNTER SYMPTOMS: BACK PAIN: 1

## 2023-11-09 NOTE — PROGRESS NOTES
Aura BOOKER Case (:  1975) is a 57 y.o. female,Established patient, here for evaluation of the following chief complaint(s):  Blood Work (Pt is fasting) and Annual Exam         ASSESSMENT/PLAN:  1. Routine general medical examination at a health care facility  -     Judy Jonas MD, Gastroenterology, [place]  -     CBC; Future  -     Lipid Panel; Future  -     Comprehensive Metabolic Panel; Future  -     TSH; Future  -     Vitamin D 25 Hydroxy; Future      No follow-ups on file. Subjective   SUBJECTIVE/OBJECTIVE:  HPI    Here for an appt - has not been seen in a year- and was put on zoloft last year     She did have back pain and went to urgent care and given robaxin ; still feels has an issue has chronic issues for lower back     Review of Systems   All other systems reviewed and are negative. Objective   Physical Exam  Vitals and nursing note reviewed. Constitutional:       Appearance: Normal appearance. HENT:      Head: Normocephalic and atraumatic. Right Ear: Tympanic membrane and external ear normal.      Left Ear: Tympanic membrane and external ear normal.      Nose: Nose normal.      Mouth/Throat:      Mouth: Mucous membranes are moist.   Eyes:      Extraocular Movements: Extraocular movements intact. Conjunctiva/sclera: Conjunctivae normal.   Cardiovascular:      Rate and Rhythm: Normal rate and regular rhythm. Pulmonary:      Effort: Pulmonary effort is normal.      Breath sounds: Normal breath sounds. Abdominal:      General: Bowel sounds are normal.      Palpations: Abdomen is soft. Musculoskeletal:         General: Normal range of motion. Cervical back: Normal range of motion. Skin:     General: Skin is warm. Neurological:      General: No focal deficit present. Mental Status: She is alert and oriented to person, place, and time. Mental status is at baseline.    Psychiatric:         Mood and Affect: Mood normal.         Behavior: Behavior

## 2023-11-10 ENCOUNTER — OFFICE VISIT (OUTPATIENT)
Age: 48
End: 2023-11-10
Payer: COMMERCIAL

## 2023-11-10 VITALS
DIASTOLIC BLOOD PRESSURE: 72 MMHG | OXYGEN SATURATION: 98 % | WEIGHT: 134 LBS | BODY MASS INDEX: 23.74 KG/M2 | HEART RATE: 69 BPM | TEMPERATURE: 98.5 F | SYSTOLIC BLOOD PRESSURE: 106 MMHG | RESPIRATION RATE: 16 BRPM | HEIGHT: 63 IN

## 2023-11-10 DIAGNOSIS — Z00.00 ROUTINE GENERAL MEDICAL EXAMINATION AT A HEALTH CARE FACILITY: Primary | ICD-10-CM

## 2023-11-10 DIAGNOSIS — G89.29 CHRONIC BILATERAL LOW BACK PAIN WITHOUT SCIATICA: ICD-10-CM

## 2023-11-10 DIAGNOSIS — M54.50 CHRONIC BILATERAL LOW BACK PAIN WITHOUT SCIATICA: ICD-10-CM

## 2023-11-10 DIAGNOSIS — Z00.00 ROUTINE GENERAL MEDICAL EXAMINATION AT A HEALTH CARE FACILITY: ICD-10-CM

## 2023-11-10 LAB
ALBUMIN SERPL-MCNC: 3.5 G/DL (ref 3.5–5)
ALBUMIN/GLOB SERPL: 1.3 (ref 1.1–2.2)
ALP SERPL-CCNC: 48 U/L (ref 45–117)
ALT SERPL-CCNC: 18 U/L (ref 12–78)
ANION GAP SERPL CALC-SCNC: 3 MMOL/L (ref 5–15)
AST SERPL-CCNC: 16 U/L (ref 15–37)
BILIRUB SERPL-MCNC: 0.4 MG/DL (ref 0.2–1)
BUN SERPL-MCNC: 15 MG/DL (ref 6–20)
BUN/CREAT SERPL: 20 (ref 12–20)
CALCIUM SERPL-MCNC: 7.9 MG/DL (ref 8.5–10.1)
CHLORIDE SERPL-SCNC: 110 MMOL/L (ref 97–108)
CHOLEST SERPL-MCNC: 144 MG/DL
CO2 SERPL-SCNC: 29 MMOL/L (ref 21–32)
CREAT SERPL-MCNC: 0.74 MG/DL (ref 0.55–1.02)
ERYTHROCYTE [DISTWIDTH] IN BLOOD BY AUTOMATED COUNT: 12.1 % (ref 11.5–14.5)
GLOBULIN SER CALC-MCNC: 2.8 G/DL (ref 2–4)
GLUCOSE SERPL-MCNC: 91 MG/DL (ref 65–100)
HCT VFR BLD AUTO: 38.5 % (ref 35–47)
HDLC SERPL-MCNC: 53 MG/DL
HDLC SERPL: 2.7 (ref 0–5)
HGB BLD-MCNC: 13.1 G/DL (ref 11.5–16)
LDLC SERPL CALC-MCNC: 82.6 MG/DL (ref 0–100)
MCH RBC QN AUTO: 34.3 PG (ref 26–34)
MCHC RBC AUTO-ENTMCNC: 34 G/DL (ref 30–36.5)
MCV RBC AUTO: 100.8 FL (ref 80–99)
NRBC # BLD: 0 K/UL (ref 0–0.01)
NRBC BLD-RTO: 0 PER 100 WBC
PLATELET # BLD AUTO: 181 K/UL (ref 150–400)
PMV BLD AUTO: 9.6 FL (ref 8.9–12.9)
POTASSIUM SERPL-SCNC: 4.4 MMOL/L (ref 3.5–5.1)
PROT SERPL-MCNC: 6.3 G/DL (ref 6.4–8.2)
RBC # BLD AUTO: 3.82 M/UL (ref 3.8–5.2)
SODIUM SERPL-SCNC: 142 MMOL/L (ref 136–145)
TRIGL SERPL-MCNC: 42 MG/DL
TSH SERPL DL<=0.05 MIU/L-ACNC: 1.33 UIU/ML (ref 0.36–3.74)
VLDLC SERPL CALC-MCNC: 8.4 MG/DL
WBC # BLD AUTO: 5.2 K/UL (ref 3.6–11)

## 2023-11-10 PROCEDURE — 99396 PREV VISIT EST AGE 40-64: CPT | Performed by: INTERNAL MEDICINE

## 2023-11-10 RX ORDER — DICLOFENAC SODIUM 75 MG/1
75 TABLET, DELAYED RELEASE ORAL 2 TIMES DAILY PRN
Qty: 60 TABLET | Refills: 1 | Status: SHIPPED | OUTPATIENT
Start: 2023-11-10

## 2023-11-10 SDOH — ECONOMIC STABILITY: FOOD INSECURITY: WITHIN THE PAST 12 MONTHS, YOU WORRIED THAT YOUR FOOD WOULD RUN OUT BEFORE YOU GOT MONEY TO BUY MORE.: NEVER TRUE

## 2023-11-10 SDOH — ECONOMIC STABILITY: HOUSING INSECURITY
IN THE LAST 12 MONTHS, WAS THERE A TIME WHEN YOU DID NOT HAVE A STEADY PLACE TO SLEEP OR SLEPT IN A SHELTER (INCLUDING NOW)?: NO

## 2023-11-10 SDOH — ECONOMIC STABILITY: FOOD INSECURITY: WITHIN THE PAST 12 MONTHS, THE FOOD YOU BOUGHT JUST DIDN'T LAST AND YOU DIDN'T HAVE MONEY TO GET MORE.: NEVER TRUE

## 2023-11-10 SDOH — ECONOMIC STABILITY: INCOME INSECURITY: HOW HARD IS IT FOR YOU TO PAY FOR THE VERY BASICS LIKE FOOD, HOUSING, MEDICAL CARE, AND HEATING?: NOT HARD AT ALL

## 2023-11-10 ASSESSMENT — PATIENT HEALTH QUESTIONNAIRE - PHQ9
SUM OF ALL RESPONSES TO PHQ QUESTIONS 1-9: 0
SUM OF ALL RESPONSES TO PHQ QUESTIONS 1-9: 0
SUM OF ALL RESPONSES TO PHQ9 QUESTIONS 1 & 2: 0
SUM OF ALL RESPONSES TO PHQ QUESTIONS 1-9: 0
SUM OF ALL RESPONSES TO PHQ QUESTIONS 1-9: 0
1. LITTLE INTEREST OR PLEASURE IN DOING THINGS: 0
2. FEELING DOWN, DEPRESSED OR HOPELESS: 0

## 2023-11-11 LAB — 25(OH)D3 SERPL-MCNC: 29.3 NG/ML (ref 30–100)

## 2023-11-13 ENCOUNTER — TELEPHONE (OUTPATIENT)
Age: 48
End: 2023-11-13

## 2023-11-13 NOTE — TELEPHONE ENCOUNTER
The referral has been obtained and faxed to Dr. Naheed Russell office at [phone]. The authorization # [id number] to include 99 visits effective 11/13/2023-11/13/2024.

## 2023-11-13 NOTE — TELEPHONE ENCOUNTER
Needs to know if patient needs a referral; if so, needs consult for a  colonoscopy referral. ICD-10 code would be z80.0.  CPT code would be [phone]    Patient is seeing Dr. Malu Woods NPI: [id number]    [address] [place] [address] Davi Ardon [address], [address] [place]    Fax: [phone]    Needs to be done today by 2pm, or procedure would have to be cancelled

## 2023-11-27 ENCOUNTER — OFFICE VISIT (OUTPATIENT)
Age: 48
End: 2023-11-27

## 2023-11-27 VITALS
HEIGHT: 66 IN | BODY MASS INDEX: 21.69 KG/M2 | DIASTOLIC BLOOD PRESSURE: 73 MMHG | SYSTOLIC BLOOD PRESSURE: 122 MMHG | WEIGHT: 135 LBS | HEART RATE: 86 BPM | TEMPERATURE: 98.9 F | RESPIRATION RATE: 18 BRPM | OXYGEN SATURATION: 18 %

## 2023-11-27 DIAGNOSIS — B37.31 YEAST VAGINITIS: Primary | ICD-10-CM

## 2023-11-27 DIAGNOSIS — B96.89 ACUTE BACTERIAL SINUSITIS: ICD-10-CM

## 2023-11-27 DIAGNOSIS — J01.90 ACUTE BACTERIAL SINUSITIS: ICD-10-CM

## 2023-11-27 RX ORDER — AZITHROMYCIN 250 MG/1
250 TABLET, FILM COATED ORAL SEE ADMIN INSTRUCTIONS
Qty: 6 TABLET | Refills: 0 | Status: SHIPPED | OUTPATIENT
Start: 2023-11-27 | End: 2023-12-02

## 2023-11-27 RX ORDER — FLUCONAZOLE 150 MG/1
150 TABLET ORAL ONCE
Qty: 1 TABLET | Refills: 0 | Status: SHIPPED | OUTPATIENT
Start: 2023-11-27 | End: 2023-11-27

## 2023-11-27 ASSESSMENT — ENCOUNTER SYMPTOMS
SINUS PRESSURE: 1
GASTROINTESTINAL NEGATIVE: 1
RESPIRATORY NEGATIVE: 1
EYES NEGATIVE: 1
RHINORRHEA: 1

## 2023-11-27 NOTE — PROGRESS NOTES
2023   Angeline BOOKER Case (: 1975) is a 57 y.o. female, New patient, here for evaluation of the following chief complaint(s):  Sinusitis (Sinus congestion and pressure in face - 2 days. Ear pressure and pain. Pt was here Saturday and tested negative for covid and strep.)     ASSESSMENT/PLAN:  Below is the assessment and plan developed based on review of pertinent history, physical exam, labs, studies, and medications. 1. Yeast vaginitis  -     fluconazole (DIFLUCAN) 150 MG tablet; Take 1 tablet by mouth once for 1 dose, Disp-1 tablet, R-0Normal  2. Acute bacterial sinusitis  -     azithromycin (ZITHROMAX) 250 MG tablet; Take 1 tablet by mouth See Admin Instructions for 5 days 500mg on day 1 followed by 250mg on days 2 - 5, Disp-6 tablet, R-0Normal         Handout given with care instructions  2. OTC for symptom management. Increase fluid intake, ensure adequate nutritional intake. 3. Follow up with PCP as needed. 4. Go to ED with development of any acute symptoms. Follow up:  Return if symptoms worsen or fail to improve. Follow up immediately for any new, worsening or changes or if symptoms are not improving over the next 5-7 days. SUBJECTIVE/OBJECTIVE:    Sinusitis  Associated symptoms include congestion and sinus pressure. Diagnoses and all orders for this visit:  Yeast vaginitis  Patient requests prescription for Diflucan. Reports she gets yeast infection after taking antibiotic. Acute bacterial sinusitis  Sinusitis (Sinus congestion and pressure in face - 2 days. Ear pressure and pain. Pt was here Saturday and tested negative for covid and strep.)  Merrell Goldmann Case is a 57 y.o. female who complains of congestion, nasal blockage, post nasal drip, and bilateral sinus pain for 2 days. She denies a history of fatigue, fevers, myalgias, and shortness of breath and does not a history of asthma. Patient has environmental/ seasonal allergies.  Patient denies exposure to smoke /

## 2023-11-27 NOTE — PATIENT INSTRUCTIONS
Gargle with warm salt water. This helps reduce swelling and relieve discomfort. Gargle once an hour with 1 teaspoon of salt mixed in 8 fluid ounces of warm water. Increase fluid intake. Take an over-the-counter pain medicine, such as acetaminophen (Tylenol), ibuprofen (Advil, Motrin), or naproxen (Aleve) to reduce fever and relieve body aches. Read and follow all instructions on the label. Use a humidifier in your room. Start OTC non drowsy antihistamine.

## 2024-01-02 ENCOUNTER — OFFICE VISIT (OUTPATIENT)
Age: 49
End: 2024-01-02
Payer: COMMERCIAL

## 2024-01-02 ENCOUNTER — TELEPHONE (OUTPATIENT)
Age: 49
End: 2024-01-02

## 2024-01-02 VITALS — SYSTOLIC BLOOD PRESSURE: 117 MMHG | DIASTOLIC BLOOD PRESSURE: 76 MMHG

## 2024-01-02 DIAGNOSIS — N89.8 VAGINAL IRRITATION: ICD-10-CM

## 2024-01-02 DIAGNOSIS — R30.0 BURNING WITH URINATION: Primary | ICD-10-CM

## 2024-01-02 LAB
BILIRUBIN, URINE, POC: NEGATIVE
BLOOD URINE, POC: NEGATIVE
GLUCOSE URINE, POC: NEGATIVE
KETONES, URINE, POC: NEGATIVE
LEUKOCYTE ESTERASE, URINE, POC: NEGATIVE
NITRITE, URINE, POC: NEGATIVE
PH, URINE, POC: 6 (ref 4.6–8)
PROTEIN,URINE, POC: NEGATIVE
SPECIFIC GRAVITY, URINE, POC: 1.02 (ref 1–1.03)
URINALYSIS CLARITY, POC: CLEAR
URINALYSIS COLOR, POC: YELLOW
UROBILINOGEN, POC: NORMAL

## 2024-01-02 PROCEDURE — 81002 URINALYSIS NONAUTO W/O SCOPE: CPT | Performed by: OBSTETRICS & GYNECOLOGY

## 2024-01-02 PROCEDURE — 99213 OFFICE O/P EST LOW 20 MIN: CPT | Performed by: OBSTETRICS & GYNECOLOGY

## 2024-01-02 NOTE — TELEPHONE ENCOUNTER
PT name and  verified    49 yo last ov 23    PT calling stating she was on Augmentin , finished around/on 23 and started with symptoms of vaginal discharge, vaginal burning with and without urination and the \"feeling uncomfortable\". PT denies any odor. PT has tried Diflucan x2, vaginal treatments and \"nothing has helped\". PT states she is not sure if this is bacterial vs yeast? 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  Per CS, PT placed on schedule today at 0950.

## 2024-01-02 NOTE — PROGRESS NOTES
Marion BOOKER Case is a 48 y.o. female presents for a problem visit.    Chief Complaint   Patient presents with    Vaginal Discharge     No LMP recorded. (Menstrual status: IUD).      The patient is reporting having: vaginal burning, irritation, discharge since 12/22, has burning with urination from irritation. Patient denies odor      Examination chaperoned by Rina Cannon MA.  
   Glucose, Urine, POC Negative     Bilirubin, Urine, POC Negative     Ketones, Urine, POC Negative     Specific Gravity, Urine, POC 1.020 1.001 - 1.035    Blood (UA POC) Negative     pH, Urine, POC 6.0 4.6 - 8.0    Protein, Urine, POC Negative     Urobilinogen, POC 0.2 mg/dL     Nitrite, Urine, POC Negative     Leukocyte Esterase, Urine, POC Negative          ASSESSMENT:    ICD-10-CM    1. Burning with urination  R30.0 AMB POC URINALYSIS DIP STICK MANUAL W/O MICRO      2. Vaginal irritation  N89.8 Nuswab Vaginitis (VG)     Culture, Urine     Culture, Urine     Nuswab Vaginitis (VG)          PLAN:  Orders Placed This Encounter    Nuswab Vaginitis (VG)     Standing Status:   Future     Number of Occurrences:   1     Standing Expiration Date:   1/2/2025    Culture, Urine     Standing Status:   Future     Number of Occurrences:   1     Standing Expiration Date:   1/2/2025     Order Specific Question:   Specify (ex-cath, midstream, cysto, etc)?     Answer:   midstream    AMB POC URINALYSIS DIP STICK MANUAL W/O MICRO     Use OTC hydrocortizone tid  Await nuswab  RTO prn if symptoms persist or worsen.  Instructions given to pt.  Handouts given to pt.

## 2024-01-03 LAB — BACTERIA UR CULT: NO GROWTH

## 2024-01-04 LAB
A VAGINAE DNA VAG QL NAA+PROBE: NORMAL SCORE
BVAB2 DNA VAG QL NAA+PROBE: NORMAL SCORE
C ALBICANS DNA VAG QL NAA+PROBE: NEGATIVE
C GLABRATA DNA VAG QL NAA+PROBE: NEGATIVE
MEGA1 DNA VAG QL NAA+PROBE: NORMAL SCORE
T VAGINALIS DNA VAG QL NAA+PROBE: NEGATIVE

## 2024-08-02 ENCOUNTER — OFFICE VISIT (OUTPATIENT)
Age: 49
End: 2024-08-02
Payer: COMMERCIAL

## 2024-08-02 VITALS — BODY MASS INDEX: 21.56 KG/M2 | DIASTOLIC BLOOD PRESSURE: 82 MMHG | SYSTOLIC BLOOD PRESSURE: 123 MMHG | WEIGHT: 133.6 LBS

## 2024-08-02 DIAGNOSIS — Z11.51 ENCOUNTER FOR SCREENING FOR HUMAN PAPILLOMAVIRUS (HPV): ICD-10-CM

## 2024-08-02 DIAGNOSIS — Z01.419 ENCOUNTER FOR GYNECOLOGICAL EXAMINATION: Primary | ICD-10-CM

## 2024-08-02 DIAGNOSIS — R30.0 DYSURIA: ICD-10-CM

## 2024-08-02 DIAGNOSIS — Z12.4 CERVICAL CANCER SCREENING: ICD-10-CM

## 2024-08-02 LAB
BILIRUBIN, URINE, POC: NEGATIVE
BLOOD URINE, POC: NEGATIVE
GLUCOSE URINE, POC: NEGATIVE
KETONES, URINE, POC: NEGATIVE
LEUKOCYTE ESTERASE, URINE, POC: NEGATIVE
NITRITE, URINE, POC: NEGATIVE
PH, URINE, POC: 5 (ref 4.6–8)
PROTEIN,URINE, POC: NEGATIVE
SPECIFIC GRAVITY, URINE, POC: 1.02 (ref 1–1.03)
URINALYSIS CLARITY, POC: CLEAR
URINALYSIS COLOR, POC: YELLOW
UROBILINOGEN, POC: NORMAL

## 2024-08-02 PROCEDURE — 99396 PREV VISIT EST AGE 40-64: CPT | Performed by: OBSTETRICS & GYNECOLOGY

## 2024-08-02 PROCEDURE — 81003 URINALYSIS AUTO W/O SCOPE: CPT | Performed by: OBSTETRICS & GYNECOLOGY

## 2024-08-02 PROCEDURE — 99459 PELVIC EXAMINATION: CPT | Performed by: OBSTETRICS & GYNECOLOGY

## 2024-08-02 NOTE — PROGRESS NOTES
Marion Cooper is a ,  48 y.o. female White (non-) whose LMP was on  who presents for her annual checkup. She is having problems - dysuria.    Menstrual status:    Her periods are light in flow, regular - Mirena    She denies dysmenorrhea.      Contraception:    The current method of family planning is IUD. Mirena 2019    Sexual history:    She  reports being sexually active and has had partner(s) who are male. She reports using the following method of birth control/protection: I.U.D..        Pap and Mammogram History:       Last Pap: 2021 normal;HPV negative.  She does not have a history of VIRGIE 2, 3 or cervical cancer.   Last Mammogram: performed in our office today  Last colonoscopy: done in January and normal per patient      Breast Cancer History    She has a family history of breast cancer.    Family History   Problem Relation Age of Onset    Hypertension Maternal Aunt     Alcohol Abuse Father     Colon Cancer Mother     Cancer Mother 65        colon    Diabetes Maternal Aunt     Breast Cancer Maternal Aunt     Cancer Father         lung and liver       Past Medical History:   Diagnosis Date    Bulging disc     Family history of skin cancer     Fetal macrosomia in pregnancy     IUD (intrauterine device) in place 2019    Mirena placed 19    Scarring     Sun-damaged skin      Past Surgical History:   Procedure Laterality Date    BREAST ENHANCEMENT SURGERY       SECTION      2    IMPLANT BREAST SILICONE/EQ      2019     Current Outpatient Medications   Medication Sig Dispense Refill    fluticasone (FLONASE) 50 MCG/ACT nasal spray USE ONE SPRAY IN EACH NOSTRIL TWICE A DAY AS NEEDED      levonorgestrel (MIRENA) IUD 52 mg 1 Device by IntraUTERine route once      diclofenac (VOLTAREN) 75 MG EC tablet Take 1 tablet by mouth 2 times daily as needed for Pain (Patient not taking: Reported on 2024) 60 tablet 1     No current facility-administered medications for this visit.

## 2024-08-02 NOTE — PROGRESS NOTES
Marion Cooper is a 48 y.o. female returns for an annual exam     Chief Complaint   Patient presents with    Annual Exam       No LMP recorded. (Menstrual status: IUD).  Her periods are  very light to non existent due to contraceptive hormones.  She does not have dysmenorrhea.  Problems: burning with urination for the past few days  Birth Control: IUD. Mirena inserted 5/13/2019  Last Pap: 7/8/2021 normal;HPV negative.  She does not have a history of VIRGIE 2, 3 or cervical cancer.   Last Mammogram: performed in our office today  Last colonoscopy: done in January and normal per patient      1. Have you been to the ER, urgent care clinic, or hospitalized since your last visit? No    2. Have you seen or consulted any other health care providers outside of the [facility] since your last visit? No    Examination chaperoned by Alina Avila LPN.

## 2024-08-03 LAB — BACTERIA UR CULT: NO GROWTH

## 2024-08-08 LAB
CYTOLOGIST CVX/VAG CYTO: NORMAL
CYTOLOGY CVX/VAG DOC CYTO: NORMAL
CYTOLOGY CVX/VAG DOC THIN PREP: NORMAL
DX ICD CODE: NORMAL
HPV GENOTYPE REFLEX: NORMAL
HPV I/H RISK 4 DNA CVX QL PROBE+SIG AMP: NEGATIVE
Lab: NORMAL
Lab: NORMAL
OTHER STN SPEC: NORMAL
STAT OF ADQ CVX/VAG CYTO-IMP: NORMAL

## 2025-02-20 ENCOUNTER — TELEPHONE (OUTPATIENT)
Age: 50
End: 2025-02-20

## 2025-02-20 RX ORDER — TERCONAZOLE 4 MG/G
1 CREAM VAGINAL NIGHTLY
Qty: 45 G | Refills: 0 | Status: SHIPPED | OUTPATIENT
Start: 2025-02-20

## 2025-02-20 NOTE — TELEPHONE ENCOUNTER
Two patient identifiers used      49 year old patient last seen in the office on 8/2/2024 for ae and has next ae on 8/4/2025 and mammogram .    Patient calling to say that she had to take a course of Augmentin and one diflucan prescribed by [facility] and still has the yeast symptoms of white vaginal discharge , redness and irritation.     Patient reports she took the took the diflucan  5 days ago. Patient reports she is also using monistat over the counter , last usins a small amount this am.        Patient pharmacy confirmed [facility]        Please advise     ?ov    Thank you

## 2025-02-21 NOTE — TELEPHONE ENCOUNTER
Patient was sent a my chart message. MD sent prescription to patient pharmacy and  patient sent my chart message back that she got the medication.

## 2025-08-04 ENCOUNTER — OFFICE VISIT (OUTPATIENT)
Age: 50
End: 2025-08-04
Payer: COMMERCIAL

## 2025-08-04 VITALS
BODY MASS INDEX: 21.44 KG/M2 | DIASTOLIC BLOOD PRESSURE: 82 MMHG | HEIGHT: 66 IN | WEIGHT: 133.4 LBS | SYSTOLIC BLOOD PRESSURE: 127 MMHG

## 2025-08-04 DIAGNOSIS — Z01.419 ENCOUNTER FOR GYNECOLOGICAL EXAMINATION (GENERAL) (ROUTINE) WITHOUT ABNORMAL FINDINGS: Primary | ICD-10-CM

## 2025-08-04 PROCEDURE — 99396 PREV VISIT EST AGE 40-64: CPT | Performed by: OBSTETRICS & GYNECOLOGY

## 2025-08-04 PROCEDURE — 99459 PELVIC EXAMINATION: CPT | Performed by: OBSTETRICS & GYNECOLOGY
